# Patient Record
Sex: FEMALE | Race: BLACK OR AFRICAN AMERICAN | Employment: UNEMPLOYED | URBAN - METROPOLITAN AREA
[De-identification: names, ages, dates, MRNs, and addresses within clinical notes are randomized per-mention and may not be internally consistent; named-entity substitution may affect disease eponyms.]

---

## 2014-10-03 LAB — COLONOSCOPY, EXTERNAL: NORMAL

## 2017-01-09 RX ORDER — FUROSEMIDE 20 MG/1
TABLET ORAL
Qty: 30 TAB | Refills: 3 | Status: SHIPPED | OUTPATIENT
Start: 2017-01-09 | End: 2017-03-03 | Stop reason: SDUPTHER

## 2017-01-15 DIAGNOSIS — E55.9 VITAMIN D DEFICIENCY: ICD-10-CM

## 2017-01-16 RX ORDER — ERGOCALCIFEROL 1.25 MG/1
CAPSULE ORAL
Qty: 12 CAP | Refills: 0 | Status: SHIPPED | OUTPATIENT
Start: 2017-01-16 | End: 2017-04-12 | Stop reason: SDUPTHER

## 2017-03-03 DIAGNOSIS — G43.009 MIGRAINE WITHOUT AURA AND WITHOUT STATUS MIGRAINOSUS, NOT INTRACTABLE: ICD-10-CM

## 2017-03-03 DIAGNOSIS — R25.2 CRAMP OF BOTH LOWER EXTREMITIES: ICD-10-CM

## 2017-03-03 DIAGNOSIS — I10 BENIGN ESSENTIAL HTN: ICD-10-CM

## 2017-03-03 RX ORDER — ATORVASTATIN CALCIUM 20 MG/1
TABLET, FILM COATED ORAL
Qty: 90 TAB | Refills: 0 | Status: SHIPPED | OUTPATIENT
Start: 2017-03-03 | End: 2017-07-11 | Stop reason: SDUPTHER

## 2017-03-03 RX ORDER — POTASSIUM CHLORIDE 20 MEQ/1
TABLET, EXTENDED RELEASE ORAL
Qty: 180 TAB | Refills: 0 | Status: SHIPPED | OUTPATIENT
Start: 2017-03-03 | End: 2017-06-27 | Stop reason: SDUPTHER

## 2017-03-03 RX ORDER — FUROSEMIDE 20 MG/1
TABLET ORAL
Qty: 90 TAB | Refills: 0 | Status: SHIPPED | OUTPATIENT
Start: 2017-03-03 | End: 2017-06-18 | Stop reason: SDUPTHER

## 2017-03-03 RX ORDER — CYCLOBENZAPRINE HCL 10 MG
TABLET ORAL
Qty: 90 TAB | Refills: 0 | Status: SHIPPED | OUTPATIENT
Start: 2017-03-03 | End: 2017-05-31 | Stop reason: SDUPTHER

## 2017-03-03 RX ORDER — TRAZODONE HYDROCHLORIDE 50 MG/1
TABLET ORAL
Qty: 90 TAB | Refills: 0 | Status: SHIPPED | OUTPATIENT
Start: 2017-03-03 | End: 2018-03-19 | Stop reason: SDUPTHER

## 2017-03-03 NOTE — TELEPHONE ENCOUNTER
Patient is requesting that these medications be filled as a 90 day prescription, she states that when she was with Dr. Sandra Galeano they were filled like this and it is a lot cheaper for her as he insurance will cover more with a 90 day prescription.      Pharmacy on file verified

## 2017-05-31 DIAGNOSIS — R25.2 CRAMP OF BOTH LOWER EXTREMITIES: ICD-10-CM

## 2017-05-31 DIAGNOSIS — G43.009 MIGRAINE WITHOUT AURA AND WITHOUT STATUS MIGRAINOSUS, NOT INTRACTABLE: ICD-10-CM

## 2017-06-01 RX ORDER — CYCLOBENZAPRINE HCL 10 MG
TABLET ORAL
Qty: 90 TAB | Refills: 0 | Status: SHIPPED | OUTPATIENT
Start: 2017-06-01 | End: 2017-08-28 | Stop reason: SDUPTHER

## 2017-06-19 RX ORDER — FUROSEMIDE 20 MG/1
TABLET ORAL
Qty: 90 TAB | Refills: 0 | Status: SHIPPED | OUTPATIENT
Start: 2017-06-19 | End: 2017-09-15 | Stop reason: SDUPTHER

## 2017-06-27 RX ORDER — POTASSIUM CHLORIDE 1500 MG/1
TABLET, EXTENDED RELEASE ORAL
Qty: 180 TAB | Refills: 0 | Status: SHIPPED | OUTPATIENT
Start: 2017-06-27 | End: 2017-07-10 | Stop reason: SDUPTHER

## 2017-06-29 RX ORDER — ENALAPRIL MALEATE 5 MG/1
TABLET ORAL
Qty: 90 TAB | Refills: 0 | Status: SHIPPED | OUTPATIENT
Start: 2017-06-29 | End: 2017-09-25 | Stop reason: SDUPTHER

## 2017-07-10 RX ORDER — POTASSIUM CHLORIDE 1500 MG/1
TABLET, EXTENDED RELEASE ORAL
Qty: 180 TAB | Refills: 0 | Status: SHIPPED | OUTPATIENT
Start: 2017-07-10 | End: 2018-10-14 | Stop reason: SDUPTHER

## 2017-07-11 RX ORDER — ATORVASTATIN CALCIUM 20 MG/1
TABLET, FILM COATED ORAL
Qty: 90 TAB | Refills: 0 | Status: SHIPPED | OUTPATIENT
Start: 2017-07-11 | End: 2017-10-09 | Stop reason: SDUPTHER

## 2017-08-07 ENCOUNTER — HOSPITAL ENCOUNTER (OUTPATIENT)
Dept: LAB | Age: 62
Discharge: HOME | End: 2017-08-07
Payer: MEDICARE

## 2017-08-07 ENCOUNTER — OFFICE VISIT (OUTPATIENT)
Dept: FAMILY MEDICINE CLINIC | Age: 62
End: 2017-08-07

## 2017-08-07 VITALS
TEMPERATURE: 97.5 F | OXYGEN SATURATION: 95 % | HEIGHT: 63 IN | WEIGHT: 288 LBS | BODY MASS INDEX: 51.03 KG/M2 | DIASTOLIC BLOOD PRESSURE: 82 MMHG | RESPIRATION RATE: 18 BRPM | HEART RATE: 87 BPM | SYSTOLIC BLOOD PRESSURE: 141 MMHG

## 2017-08-07 DIAGNOSIS — R73.9 ELEVATED BLOOD SUGAR: ICD-10-CM

## 2017-08-07 DIAGNOSIS — E78.00 HYPERCHOLESTEROLEMIA: ICD-10-CM

## 2017-08-07 DIAGNOSIS — E55.9 VITAMIN D DEFICIENCY: ICD-10-CM

## 2017-08-07 DIAGNOSIS — R60.9 DEPENDENT EDEMA: ICD-10-CM

## 2017-08-07 DIAGNOSIS — I10 BENIGN ESSENTIAL HTN: Primary | ICD-10-CM

## 2017-08-07 DIAGNOSIS — R05.9 COUGH: ICD-10-CM

## 2017-08-07 PROCEDURE — 80061 LIPID PANEL: CPT

## 2017-08-07 PROCEDURE — 83036 HEMOGLOBIN GLYCOSYLATED A1C: CPT

## 2017-08-07 PROCEDURE — 80053 COMPREHEN METABOLIC PANEL: CPT

## 2017-08-07 PROCEDURE — 36415 COLL VENOUS BLD VENIPUNCTURE: CPT

## 2017-08-07 PROCEDURE — 82306 VITAMIN D 25 HYDROXY: CPT

## 2017-08-07 NOTE — PROGRESS NOTES
Chief Complaint   Patient presents with    Cholesterol Problem     follow up     Hypertension     follow up      1. Have you been to the ER, urgent care clinic since your last visit? Hospitalized since your last visit? No    2. Have you seen or consulted any other health care providers outside of the [facility] since your last visit? Include any pap smears or colon screening.  No

## 2017-08-07 NOTE — MR AVS SNAPSHOT
Visit Information Date & Time Provider Department Dept. Phone Encounter #  
 8/7/2017  5:30 PM Isabel Mason, [facility] [phone] [id number] Upcoming Health Maintenance Date Due DTaP/Tdap/Td series (1 - Tdap) 9/7/1975 PAP AKA CERVICAL CYTOLOGY 9/7/1975 BREAST CANCER SCRN MAMMOGRAM 9/7/2004 ZOSTER VACCINE AGE 60> 7/7/2014 INFLUENZA AGE 9 TO ADULT 8/1/2017 COLONOSCOPY 2/1/2022 Allergies as of 8/7/2017  Review Complete On: 8/7/2017 By: Isabel Mason NP Severity Noted Reaction Type Reactions Aspirin Medium 11/11/2013   Side Effect Nausea Only Current Immunizations  Reviewed on 11/19/2014 Name Date Influenza Vaccine (Quad) PF 10/20/2016 Influenza Vaccine PF 11/21/2014  9:28 AM  
  
 Not reviewed this visit You Were Diagnosed With   
  
 Codes Comments Benign essential HTN    -  Primary ICD-10-CM: I10 
ICD-9-CM: 401.1 Hypercholesterolemia     ICD-10-CM: E78.00 ICD-9-CM: 272.0 Elevated blood sugar     ICD-10-CM: R73.9 ICD-9-CM: 790.29 Vitamin D deficiency     ICD-10-CM: E55.9 ICD-9-CM: 268.9 Cough     ICD-10-CM: R05 ICD-9-CM: 495. 2 Vitals BP Pulse Temp Resp Height(growth percentile) Weight(growth percentile) 141/82 (BP 1 Location: Left arm, BP Patient Position: Sitting) 87 97.5 °F (36.4 °C) (Oral) 18 5' 3\" (1.6 m) 288 lb (130.6 kg) SpO2 BMI OB Status Smoking Status 95% 51.02 kg/m2 Hysterectomy Never Smoker Vitals History BMI and BSA Data Body Mass Index Body Surface Area 51.02 kg/m 2 2.41 m 2 Preferred Pharmacy Pharmacy Name Phone [id number] [place] [address] [facility] [phone] Your Updated Medication List  
  
   
This list is accurate as of: 8/7/17  6:01 PM.  Always use your most recent med list.  
  
  
  
  
 atorvastatin 20 mg tablet Commonly known as:  LIPITOR TAKE 1 TABLET BY MOUTH EVERY DAY  
  
 cyclobenzaprine 10 mg tablet Commonly known as:  FLEXERIL  
TAKE 1 TABLET BY MOUTH EVERY DAY  
  
 EMETROL Soln solution Generic drug:  phosphorated carbohydrate Take 15-30 mL by mouth every fifteen (15) minutes as needed for Nausea. enalapril 5 mg tablet Commonly known as:  VASOTEC  
TAKE 1 TABLET BY MOUTH EVERY DAY  
  
 ergocalciferol 50,000 unit capsule Commonly known as:  ERGOCALCIFEROL  
TAKE 1 CAP BY MOUTH EVERY SEVEN (7) DAYS. ON MONDAY  
  
 furosemide 20 mg tablet Commonly known as:  LASIX TAKE 1 TABLET BY MOUTH EVERY DAY  
  
 hydroCHLOROthiazide 25 mg tablet Commonly known as:  HYDRODIURIL  
TAKE 1 TAB BY MOUTH DAILY. KLOR-CON M20 20 mEq tablet Generic drug:  potassium chloride TAKE 1 TABLET BY MOUTH 2 TIMES A DAY. traZODone 50 mg tablet Commonly known as:  DESYREL  
TAKE 1 TABLET BY MOUTH NIGHTLY AS NEEDED FOR SLEEP We Performed the Following HEMOGLOBIN A1C WITH EAG [08549 CPT(R)] LIPID PANEL [21740 CPT(R)] METABOLIC PANEL, COMPREHENSIVE [66151 CPT(R)] AR COLLECTION VENOUS BLOOD,VENIPUNCTURE [id number] CPT(R)] AR HANDLG&/OR CONVEY OF SPEC FOR TR OFFICE TO LAB [42553 CPT(R)] VITAMIN D, 25 HYDROXY [id number] CPT(R)] Introducing [facility] & HEALTH SERVICES! Dear Marylu Angelo: Thank you for requesting a [employer] account. Our records indicate that you have previously registered for a [employer] account but its currently inactive. Please call our [employer] support line at [phone]. Additional Information If you have questions, please visit the Frequently Asked Questions section of the [employer] website at [email or web address] Remember, [employer] is NOT to be used for urgent needs. For medical emergencies, dial 911. Now available from your iPhone and Android! Please provide this summary of care documentation to your next provider. Your primary care clinician is listed as Yasmin Acosta. If you have any questions after today's visit, please call [phone].

## 2017-08-08 LAB
25(OH)D3+25(OH)D2 SERPL-MCNC: 37.5 NG/ML (ref 30–100)
ALBUMIN SERPL-MCNC: 4.5 G/DL (ref 3.6–4.8)
ALBUMIN/GLOB SERPL: 1.4 {RATIO} (ref 1.2–2.2)
ALP SERPL-CCNC: 85 IU/L (ref 39–117)
ALT SERPL-CCNC: 15 IU/L (ref 0–32)
AST SERPL-CCNC: 19 IU/L (ref 0–40)
BILIRUB SERPL-MCNC: 0.8 MG/DL (ref 0–1.2)
BUN SERPL-MCNC: 12 MG/DL (ref 8–27)
BUN/CREAT SERPL: 11 (ref 12–28)
CALCIUM SERPL-MCNC: 9.8 MG/DL (ref 8.7–10.3)
CHLORIDE SERPL-SCNC: 98 MMOL/L (ref 96–106)
CHOLEST SERPL-MCNC: 144 MG/DL (ref 100–199)
CO2 SERPL-SCNC: 24 MMOL/L (ref 18–29)
CREAT SERPL-MCNC: 1.05 MG/DL (ref 0.57–1)
GLOBULIN SER CALC-MCNC: 3.3 G/DL (ref 1.5–4.5)
GLUCOSE SERPL-MCNC: 85 MG/DL (ref 65–99)
HBA1C MFR BLD: NORMAL %
HDLC SERPL-MCNC: 46 MG/DL
INTERPRETATION, 910389: NORMAL
INTERPRETATION: NORMAL
LDLC SERPL CALC-MCNC: 64 MG/DL (ref 0–99)
PDF IMAGE, 910387: NORMAL
POTASSIUM SERPL-SCNC: 3.7 MMOL/L (ref 3.5–5.2)
PROT SERPL-MCNC: 7.8 G/DL (ref 6–8.5)
SODIUM SERPL-SCNC: 142 MMOL/L (ref 134–144)
TRIGL SERPL-MCNC: 168 MG/DL (ref 0–149)
VLDLC SERPL CALC-MCNC: 34 MG/DL (ref 5–40)

## 2017-08-08 NOTE — PROGRESS NOTES
HISTORY OF PRESENT ILLNESS  Francisco Hernandez is a 57 y.o. female. HPI  Follow up chronic medical problems. HTN. Taking prescribed meds. Hypercholesterolemia. Following healthy diet and exercising regularly. Taking atorvastatin as prescribed. Elevated FBS. Has decreased sweets and processed carbs in diet. Vitamin D deficiency. Recently completed 12 weeks of rx D. Currently not taking any supplement. Migraine headache. Takes flexeril with some sx relief. Dependent edema. Taking lasix daily. C/o dry cough at night over past few weeks. No history of asthma. Mild AR. No recent unusual exposures. Past Medical History:   Diagnosis Date    Arthritis     [facility]el obstruction ([facility]     Chronic pain     Diverticulitis     Hypercholesteremia     Hypertension     Migraines     Sickle cell trait ([facility]     Stroke ([facility] 1993    \"LEFT SIDE WEAKNESS\"    Unspecified adverse effect of anesthesia 2014    WOKE UP DURING COLONOSCOPY    Unspecified sleep apnea     NO CPAP     Patient Active Problem List   Diagnosis Code    Benign essential HTN I10    Hypercholesterolemia E78.00    Elevated blood sugar R73.9    Vitamin D deficiency E55.9    Migraine without aura and without status migrainosus, not intractable G43.009    Primary insomnia F51.01     Current Outpatient Prescriptions   Medication Sig    atorvastatin (LIPITOR) 20 mg tablet TAKE 1 TABLET BY MOUTH EVERY DAY    KLOR-CON M20 20 mEq tablet TAKE 1 TABLET BY MOUTH 2 TIMES A DAY.  enalapril (VASOTEC) 5 mg tablet TAKE 1 TABLET BY MOUTH EVERY DAY    furosemide (LASIX) 20 mg tablet TAKE 1 TABLET BY MOUTH EVERY DAY    hydroCHLOROthiazide (HYDRODIURIL) 25 mg tablet TAKE 1 TAB BY MOUTH DAILY.  ergocalciferol (ERGOCALCIFEROL) 50,000 unit capsule TAKE 1 CAP BY MOUTH EVERY SEVEN (7) DAYS.  ON MONDAY    traZODone (DESYREL) 50 mg tablet TAKE 1 TABLET BY MOUTH NIGHTLY AS NEEDED FOR SLEEP    phosphorated carbohydrate (EMETROL) soln solution Take 15-30 mL by mouth every fifteen (15) minutes as needed for Nausea.  cyclobenzaprine (FLEXERIL) 10 mg tablet TAKE 1 TABLET BY MOUTH EVERY DAY     No current facility-administered medications for this visit. Social History   Substance Use Topics    Smoking status: Never Smoker    Smokeless tobacco: Never Used    Alcohol use Yes      Comment: occasionally     Visit Vitals    /82 (BP 1 Location: Left arm, BP Patient Position: Sitting)    Pulse 87    Temp 97.5 °F (36.4 °C) (Oral)    Resp 18    Ht 5' 3\" (1.6 m)    Wt 288 lb (130.6 kg)    SpO2 95%    BMI 51.02 kg/m2     Review of Systems   Constitutional: Negative for chills and fever. Respiratory: Positive for cough. Negative for sputum production, shortness of breath and wheezing. Cardiovascular: Positive for leg swelling (intermittent). Negative for chest pain, palpitations and claudication. Musculoskeletal: Positive for back pain (intermittent). Neurological: Positive for headaches (occasional). All other systems reviewed and are negative. Physical Exam   Constitutional:   Overweight. Neck: Neck supple. No JVD present. Cardiovascular: Normal rate, regular rhythm and normal heart sounds. Pulmonary/Chest: Effort normal and breath sounds normal.   Abdominal: Soft. She exhibits no distension. There is no tenderness. There is no guarding. Musculoskeletal: Normal range of motion. She exhibits no edema. Lymphadenopathy:     She has no cervical adenopathy. Neurological: She is alert. Skin: Skin is warm and dry. Psychiatric: She has a normal mood and affect. Her behavior is normal.       ASSESSMENT and PLAN  Diagnoses and all orders for this visit:    1. Benign essential HTN  -     MS HANDLG&/OR CONVEY OF SPEC FOR TR OFFICE TO LAB  -     MS COLLECTION VENOUS BLOOD,VENIPUNCTURE  -     LIPID PANEL  -     METABOLIC PANEL, COMPREHENSIVE  Controlled. Will check labs this evening. Patient had 1/2 soft pretzel about 8 hours ago.     2. Hypercholesterolemia  Reviewed healthy,AHA diet and exercise recommendations. 3. Elevated blood sugar  -     HEMOGLOBIN A1C WITH EAG  Reviewed diabetic diet and carbohydrate restriction. 4. Vitamin D deficiency  -     VITAMIN D, 25 HYDROXY  Recheck D. Take OTC vitamin D3 1,000 iu daily. 5. Cough  May be secondary to AR. Trial benadryl 25 mg at bedtime. 6. Dependent edema    Stable on lasix. Follow up 6 months or sooner prn.

## 2017-08-28 DIAGNOSIS — G43.009 MIGRAINE WITHOUT AURA AND WITHOUT STATUS MIGRAINOSUS, NOT INTRACTABLE: ICD-10-CM

## 2017-08-28 DIAGNOSIS — R25.2 CRAMP OF BOTH LOWER EXTREMITIES: ICD-10-CM

## 2017-08-28 RX ORDER — CYCLOBENZAPRINE HCL 10 MG
TABLET ORAL
Qty: 90 TAB | Refills: 0 | Status: SHIPPED | OUTPATIENT
Start: 2017-08-28 | End: 2017-11-24 | Stop reason: SDUPTHER

## 2017-09-15 RX ORDER — FUROSEMIDE 20 MG/1
TABLET ORAL
Qty: 90 TAB | Refills: 1 | Status: SHIPPED | OUTPATIENT
Start: 2017-09-15 | End: 2018-03-16 | Stop reason: SDUPTHER

## 2017-09-25 RX ORDER — ENALAPRIL MALEATE 5 MG/1
TABLET ORAL
Qty: 90 TAB | Refills: 1 | Status: SHIPPED | OUTPATIENT
Start: 2017-09-25 | End: 2017-10-17 | Stop reason: SDUPTHER

## 2017-10-09 RX ORDER — ATORVASTATIN CALCIUM 20 MG/1
TABLET, FILM COATED ORAL
Qty: 90 TAB | Refills: 0 | Status: SHIPPED | OUTPATIENT
Start: 2017-10-09 | End: 2018-02-12 | Stop reason: SDUPTHER

## 2017-11-24 DIAGNOSIS — G43.009 MIGRAINE WITHOUT AURA AND WITHOUT STATUS MIGRAINOSUS, NOT INTRACTABLE: ICD-10-CM

## 2017-11-24 DIAGNOSIS — R25.2 CRAMP OF BOTH LOWER EXTREMITIES: ICD-10-CM

## 2017-11-26 RX ORDER — CYCLOBENZAPRINE HCL 10 MG
TABLET ORAL
Qty: 90 TAB | Refills: 0 | Status: SHIPPED | OUTPATIENT
Start: 2017-11-26 | End: 2018-03-16 | Stop reason: SDUPTHER

## 2018-02-19 DIAGNOSIS — I10 BENIGN ESSENTIAL HTN: ICD-10-CM

## 2018-02-19 RX ORDER — HYDROCHLOROTHIAZIDE 25 MG/1
TABLET ORAL
Qty: 90 TAB | Refills: 0 | Status: SHIPPED | OUTPATIENT
Start: 2018-02-19 | End: 2018-05-19 | Stop reason: SDUPTHER

## 2018-03-15 ENCOUNTER — PATIENT OUTREACH (OUTPATIENT)
Dept: FAMILY MEDICINE CLINIC | Age: 63
End: 2018-03-15

## 2018-03-15 NOTE — PROGRESS NOTES
Patient received on discharge report. [facility]. Reports pain onset  over the past few days. Endorses recent flight to [place] [employer]. Imaging completed: Xray L knee- noted for marked joint space narrowing of the left medial tibiofemoral compartment, findings consistent with osteoarthrosis. New Rx: Zofran ODT 4mg, Percocet 5-325mg, Prednisone 20mg. Outbound call made 3/15/18 to complete discharge assessment. NN left voicemail message with contact information requesting return call. Letter mailed.

## 2018-03-15 NOTE — LETTER
3/15/2018 2:57 PM 
 
Ms. Michael Ayala [address] [address] Dear MsClaudine Amilcar Suresh attempted to reach you today as a follow-up to your emergency room visit on 3/14/18 at [facility]. The ER discharge summary recommended that you follow up with your primary care practitioner. We have found that it is beneficial for our patients to have regular follow up with their primary care doctor's who will address any issues and adjust treatment accordingly. Our practice has extended hours to meet our patients needs and there is always an on call practitioner when the office is closed. Please call our office at the number above to schedule your ER follow-up with Tommy Braswell NP at your earliest convenience and don't hesitate to call me with any questions or concerns. Thank you for allowing us to participate in your care! Sincerely, Nisha Kincaid RN

## 2018-03-16 ENCOUNTER — TELEPHONE (OUTPATIENT)
Dept: FAMILY MEDICINE CLINIC | Age: 63
End: 2018-03-16

## 2018-03-16 DIAGNOSIS — R25.2 CRAMP OF BOTH LOWER EXTREMITIES: ICD-10-CM

## 2018-03-16 DIAGNOSIS — G43.009 MIGRAINE WITHOUT AURA AND WITHOUT STATUS MIGRAINOSUS, NOT INTRACTABLE: ICD-10-CM

## 2018-03-16 RX ORDER — CYCLOBENZAPRINE HCL 10 MG
TABLET ORAL
Qty: 90 TAB | Refills: 0 | Status: SHIPPED | OUTPATIENT
Start: 2018-03-16 | End: 2018-06-28 | Stop reason: SDUPTHER

## 2018-03-16 NOTE — TELEPHONE ENCOUNTER
Called and spoke with patient. Advised to make an appointment so that her medication can be refilled. Patient stated that she already has an appointment made, but she doesn't have one scheduled. Patient states that she will call back.

## 2018-03-16 NOTE — TELEPHONE ENCOUNTER
----- Message from [facility] sent at 3/16/2018  3:57 PM EDT -----  Regarding: NP Proctor/Telephone  Pt would like a call back from [facility] about an appt. Pt best contact [phone].

## 2018-03-16 NOTE — TELEPHONE ENCOUNTER
She is past due for a 6 mth fasting [facility]   After she makes appt, can OK refill of furosemide

## 2018-03-16 NOTE — TELEPHONE ENCOUNTER
Called and spoke with patient. Patient has an appointment scheduled for Monday, March 19, 2018 02:00 PM. Patient states that she will wait until her OV for refills.

## 2018-03-18 RX ORDER — FUROSEMIDE 20 MG/1
TABLET ORAL
Qty: 90 TAB | Refills: 0 | Status: SHIPPED | OUTPATIENT
Start: 2018-03-18 | End: 2018-06-13 | Stop reason: SDUPTHER

## 2018-03-19 ENCOUNTER — OFFICE VISIT (OUTPATIENT)
Dept: FAMILY MEDICINE CLINIC | Age: 63
End: 2018-03-19

## 2018-03-19 ENCOUNTER — HOSPITAL ENCOUNTER (OUTPATIENT)
Dept: LAB | Age: 63
Discharge: HOME | End: 2018-03-19
Payer: MEDICARE

## 2018-03-19 VITALS
HEART RATE: 102 BPM | WEIGHT: 277 LBS | OXYGEN SATURATION: 93 % | TEMPERATURE: 98.6 F | BODY MASS INDEX: 49.08 KG/M2 | HEIGHT: 63 IN | SYSTOLIC BLOOD PRESSURE: 121 MMHG | RESPIRATION RATE: 18 BRPM | DIASTOLIC BLOOD PRESSURE: 81 MMHG

## 2018-03-19 DIAGNOSIS — E66.01 MORBID OBESITY WITH BMI OF 45.0-49.9, ADULT (HCC): ICD-10-CM

## 2018-03-19 DIAGNOSIS — F51.01 PRIMARY INSOMNIA: ICD-10-CM

## 2018-03-19 DIAGNOSIS — E78.00 HYPERCHOLESTEROLEMIA: ICD-10-CM

## 2018-03-19 DIAGNOSIS — I10 BENIGN ESSENTIAL HTN: Primary | ICD-10-CM

## 2018-03-19 DIAGNOSIS — R73.03 PRE-DIABETES: ICD-10-CM

## 2018-03-19 PROCEDURE — 80053 COMPREHEN METABOLIC PANEL: CPT

## 2018-03-19 PROCEDURE — 36415 COLL VENOUS BLD VENIPUNCTURE: CPT

## 2018-03-19 PROCEDURE — 83036 HEMOGLOBIN GLYCOSYLATED A1C: CPT

## 2018-03-19 RX ORDER — TRAZODONE HYDROCHLORIDE 50 MG/1
TABLET ORAL
Qty: 90 TAB | Refills: 0 | Status: SHIPPED | OUTPATIENT
Start: 2018-03-19 | End: 2018-05-14 | Stop reason: SDUPTHER

## 2018-03-19 NOTE — PROGRESS NOTES
HISTORY OF PRESENT ILLNESS  Heath Vuong is a 60 y.o. female. HPI  6 month follow up chronic health problems. HTN. Adhering to medication regimen. Hypercholesterolemia. Taking lipitor as prescribed. Following healthy diet,  Walks 2 miles daily. Pre diabetes. Has reduced sugar and processed carbs in diet. Dependent edema. Taking lasix daily. Insomnia. Using trazodone on and off with good effect. Requesting rx refill. Past Medical History:   Diagnosis Date    Arthritis     Bowel obstruction     Chronic pain     Diverticulitis     Hypercholesteremia     Hypertension     Migraines     Sick[facility] cell trait ([facility]     Stroke ([facility] 1993    \"LEFT SIDE WEAKNESS\"    Unspecified adverse effect of anesthesia 2014    WOKE UP DURING COLONOSCOPY    Unspecified sleep apnea     NO CPAP    Vitamin D deficiency      Patient Active Problem List   Diagnosis Code    Benign essential HTN I10    Hypercholesterolemia E78.00    Primary insomnia F51.01    Morbid obesity with BMI of 45.0-49.9, adult ([facility]) E66.01, Z68.42     Current Outpatient Prescriptions   Medication Sig    traZODone (DESYREL) 50 mg tablet TAKE 1-2 TABLETS BY MOUTH NIGHTLY AS NEEDED FOR SLEEP    furosemide (LASIX) 20 mg tablet TAKE 1 TABLET BY MOUTH EVERY DAY    cyclobenzaprine (FLEXERIL) 10 mg tablet TAKE 1 TABLET BY MOUTH EVERY DAY    hydroCHLOROthiazide (HYDRODIURIL) 25 mg tablet TAKE 1 TAB BY MOUTH DAILY. APPOINTMENT DUE.  atorvastatin (LIPITOR) 20 mg tablet Take 1 Tab by mouth daily. Appointment due.  enalapril (VASOTEC) 5 mg tablet TAKE 1 TABLET BY MOUTH EVERY DAY    KLOR-CON M20 20 mEq tablet TAKE 1 TABLET BY MOUTH 2 TIMES A DAY.  phosphorated carbohydrate (EMETROL) soln solution Take 15-30 mL by mouth every fifteen (15) minutes as needed for Nausea. No current facility-administered medications for this visit.       Social History   Substance Use Topics    Smoking status: Never Smoker    Smokeless tobacco: Never Used    Alcohol use Yes      Comment: occasionally     Visit Vitals    /81 (BP 1 Location: Left arm, BP Patient Position: Sitting)    Pulse (!) 102    Temp 98.6 °F (37 °C) (Oral)    Resp 18    Ht 5' 3\" (1.6 m)    Wt 277 lb (125.6 kg)    SpO2 93%    BMI 49.07 kg/m2     Review of Systems   Constitutional: Negative. Respiratory: Negative for cough and shortness of breath. Cardiovascular: Positive for leg swelling (stable). Negative for chest pain and palpitations. Psychiatric/Behavioral: The patient has insomnia. All other systems reviewed and are negative. Physical Exam   Constitutional: No distress. Obese. Neck: Neck supple. Cardiovascular: Normal rate, regular rhythm and normal heart sounds. Pulmonary/Chest: Effort normal and breath sounds normal.   Abdominal: Soft. She exhibits no distension. There is no tenderness. There is no guarding. Musculoskeletal: She exhibits no edema. Lymphadenopathy:     She has no cervical adenopathy. Neurological: She is alert. Skin: Skin is warm and dry. Psychiatric: She has a normal mood and affect. ASSESSMENT and PLAN  Diagnoses and all orders for this visit:    1. Benign essential HTN  -     METABOLIC PANEL, COMPREHENSIVE  -     [place] HANDLG&/OR CONVEY OF SPEC FOR TR OFFICE TO LAB  -     COLLECTION VENOUS BLOOD,VENIPUNCTURE  Well controlled  Continue current treatment. Check non fasting labs today. 2. Hypercholesterolemia  LDL at goal with last labs. Triglycerides mildly elevated. Reviewed healthy,AHA diet and exercise recommendations. Continue lipitor. 3. Pre-diabetes  -     HEMOGLOBIN A1C WITH EAG  Reviewed diabetic diet and carbohydrate restriction. 4. Primary insomnia  -     traZODone (DESYREL) 50 mg tablet; TAKE 1-2 TABLETS BY MOUTH NIGHTLY AS NEEDED FOR SLEEP  Stable on prn trazodone. Medication risks, benefits and potential side effects were reviewed. Sleep hygiene reviewed.     5. Morbid obesity with BMI of 45.0-49.9, adult ([facility]  Weight loss recommendations given. Continue daily walking regimen. Follow up 6 months or sooner prn.

## 2018-03-19 NOTE — PROGRESS NOTES
Chief Complaint   Patient presents with   [facility] Follow Up     [facility] 3/14/18 Degenerative Joint Disease. L leg    Leg Pain       1. Have you been to the ER, urgent care clinic since your last visit? Hospitalized since your last visit? Yes Where: [facility]    2. Have you seen or consulted any other health care providers outside of the [address] [place] [address] since your last visit? Include any pap smears or colon screening.  No

## 2018-03-19 NOTE — MR AVS SNAPSHOT
Lavaun Jeans 
 
 
 [address] [facility] [address] [place] [address] 
[phone] Patient: Yesenia Adam MRN: [id number] VGL:[id number] Visit Information Date & Time Provider Department Dept. Phone Encounter #  
 3/19/2018  2:00 PM Diogo Abad, [address] [facility] [phone] [id number] Upcoming Health Maintenance Date Due  
 BREAST CANCER SCRN MAMMOGRAM 9/7/2004 ZOSTER VACCINE AGE 60> 7/7/2014 PAP AKA CERVICAL CYTOLOGY 3/19/2021 COLONOSCOPY 2/1/2022 DTaP/Tdap/Td series (2 - Td) 8/7/2027 Allergies as of 3/19/2018  Review Complete On: 3/19/2018 By: Diogo Abad NP Severity Noted Reaction Type Reactions Aspirin Medium 11/11/2013   Side Effect Nausea Only Current Immunizations  Reviewed on 11/19/2014 Name Date Influenza Vaccine (Quad) PF 10/20/2016 Influenza Vaccine PF 11/21/2014  9:28 AM  
  
 Not reviewed this visit You Were Diagnosed With   
  
 Codes Comments Benign essential HTN    -  Primary ICD-10-CM: I10 
ICD-9-CM: 401.1 Hypercholesterolemia     ICD-10-CM: E78.00 ICD-9-CM: 272.0 Pre-diabetes     ICD-10-CM: R73.03 
ICD-9-CM: 790.29 Primary insomnia     ICD-10-CM: F51.01 
ICD-9-CM: 307.42 Vitals BP Pulse Temp Resp Height(growth percentile) Weight(growth percentile) 121/81 (BP 1 Location: Left arm, BP Patient Position: Sitting) (!) 102 98.6 °F (37 °C) (Oral) 18 5' 3\" (1.6 m) 277 lb (125.6 kg) SpO2 BMI OB Status Smoking Status 93% 49.07 kg/m2 Hysterectomy Never Smoker BMI and BSA Data Body Mass Index Body Surface Area 49.07 kg/m 2 2.36 m 2 Preferred Pharmacy Pharmacy Name Phone [id number] [place], [address] [phone] Your Updated Medication List  
  
   
This list is accurate as of 3/19/18  2:32 PM.  Always use your most recent med list.  
  
  
  
  
 atorvastatin 20 mg tablet Commonly known as:  LIPITOR Take 1 Tab by mouth daily. Appointment due. cyclobenzaprine 10 mg tablet Commonly known as:  FLEXERIL  
TAKE 1 TABLET BY MOUTH EVERY DAY  
  
 EMETROL Soln solution Generic drug:  phosphorated carbohydrate Take 15-30 mL by mouth every fifteen (15) minutes as needed for Nausea. enalapril 5 mg tablet Commonly known as:  VASOTEC  
TAKE 1 TABLET BY MOUTH EVERY DAY  
  
 furosemide 20 mg tablet Commonly known as:  LASIX TAKE 1 TABLET BY MOUTH EVERY DAY  
  
 hydroCHLOROthiazide 25 mg tablet Commonly known as:  HYDRODIURIL  
TAKE 1 TAB BY MOUTH DAILY. APPOINTMENT DUE. KLOR-CON M20 20 mEq tablet Generic drug:  potassium chloride TAKE 1 TABLET BY MOUTH 2 TIMES A DAY. traZODone 50 mg tablet Commonly known as:  DESYREL  
TAKE 1-2 TABLETS BY MOUTH NIGHTLY AS NEEDED FOR SLEEP Prescriptions Sent to Pharmacy Refills  
 traZODone (DESYREL) 50 mg tablet 0 Sig: TAKE 1-2 TABLETS BY MOUTH NIGHTLY AS NEEDED FOR SLEEP Class: Normal  
 Pharmacy: [facility]/pharmacy [address] [place] [address] #: [phone] We Performed the Following COLLECTION VENOUS BLOOD,VENIPUNCTURE [id number] CPT(R)] HEMOGLOBIN A1C WITH EAG [54603 CPT(R)] METABOLIC PANEL, COMPREHENSIVE [22970 CPT(R)] [place] HANDLG&/OR CONVEY OF SPEC FOR TR OFFICE TO LAB [20859 CPT(R)] Introducing [facility] & HEALTH SERVICES! [employer] introduces [employer] patient portal. Now you can access parts of your medical record, email your doctor's office, and request medication refills online. 1. In your internet browser, go to [email or web address] 2. Click on the First Time User? Click Here link in the Sign In box. You will see the New Member Sign Up page. 3. Enter your [employer] Access Code exactly as it appears below.  You will not need to use this code after youve completed the sign-up process. If you do not sign up before the expiration date, you must request a new code. · [facility] Access Code: [email or web address] Expires: 6/17/2018  1:48 PM 
 
4. Enter the last four digits of your Social Security Number (xxxx) and Date of Birth (mm/dd/yyyy) as indicated and click Submit. You will be taken to the next sign-up page. 5. Create a [facility] ID. This will be your [facility] login ID and cannot be changed, so think of one that is secure and easy to remember. 6. Create a [facility] password. You can change your password at any time. 7. Enter your Password Reset Question and Answer. This can be used at a later time if you forget your password. 8. Enter your e-mail address. You will receive e-mail notification when new information is available in [address]. 9. Click Sign Up. You can now view and download portions of your medical record. 10. Click the Download Summary menu link to download a portable copy of your medical information. If you have questions, please visit the Frequently Asked Questions section of the [facility] website. Remember, [facility] is NOT to be used for urgent needs. For medical emergencies, dial 911. Now available from your iPhone and Android! Please provide this summary of care documentation to your next provider. Your primary care clinician is listed as Patricia Hendrickson. If you have any questions after today's visit, please call [phone].

## 2018-03-20 LAB
ALBUMIN SERPL-MCNC: 4.6 G/DL (ref 3.6–4.8)
ALBUMIN/GLOB SERPL: 1.6 {RATIO} (ref 1.2–2.2)
ALP SERPL-CCNC: 67 IU/L (ref 39–117)
ALT SERPL-CCNC: 20 IU/L (ref 0–32)
AST SERPL-CCNC: 18 IU/L (ref 0–40)
BILIRUB SERPL-MCNC: 0.4 MG/DL (ref 0–1.2)
BUN SERPL-MCNC: 24 MG/DL (ref 8–27)
BUN/CREAT SERPL: 18 (ref 12–28)
CALCIUM SERPL-MCNC: 10.5 MG/DL (ref 8.7–10.3)
CHLORIDE SERPL-SCNC: 101 MMOL/L (ref 96–106)
CO2 SERPL-SCNC: 25 MMOL/L (ref 18–29)
CREAT SERPL-MCNC: 1.32 MG/DL (ref 0.57–1)
EST. AVERAGE GLUCOSE BLD GHB EST-MCNC: 126 MG/DL
GFR SERPLBLD CREATININE-BSD FMLA CKD-EPI: 43 ML/MIN/1.73
GFR SERPLBLD CREATININE-BSD FMLA CKD-EPI: 50 ML/MIN/1.73
GLOBULIN SER CALC-MCNC: 2.9 G/DL (ref 1.5–4.5)
GLUCOSE SERPL-MCNC: 139 MG/DL (ref 65–99)
HBA1C MFR BLD: 6 % (ref 4.8–5.6)
INTERPRETATION: NORMAL
POTASSIUM SERPL-SCNC: 4.8 MMOL/L (ref 3.5–5.2)
PROT SERPL-MCNC: 7.5 G/DL (ref 6–8.5)
SODIUM SERPL-SCNC: 145 MMOL/L (ref 134–144)

## 2018-04-20 ENCOUNTER — TELEPHONE (OUTPATIENT)
Dept: FAMILY MEDICINE CLINIC | Age: 63
End: 2018-04-20

## 2018-04-24 ENCOUNTER — TELEPHONE (OUTPATIENT)
Dept: FAMILY MEDICINE CLINIC | Age: 63
End: 2018-04-24

## 2018-04-24 NOTE — TELEPHONE ENCOUNTER
YUNG Alcantara LPN        Caller: Unspecified (Today, 12:27 PM)                     Okay to refer to ortho.        She will schedule appt

## 2018-04-24 NOTE — TELEPHONE ENCOUNTER
Patient requesting a call back from nurse in ref to additional info needed for a referral Best # [phone]

## 2018-04-24 NOTE — TELEPHONE ENCOUNTER
Seen 03/19/18 was in ER on 03/14/18  Has DJD, given steroids which helped at the time. Left leg pain.  wants to see ortho as its still very painful

## 2018-05-14 DIAGNOSIS — F51.01 PRIMARY INSOMNIA: ICD-10-CM

## 2018-05-14 RX ORDER — TRAZODONE HYDROCHLORIDE 50 MG/1
TABLET ORAL
Qty: 90 TAB | Refills: 0 | Status: SHIPPED | OUTPATIENT
Start: 2018-05-14 | End: 2018-07-05 | Stop reason: SDUPTHER

## 2018-06-13 RX ORDER — FUROSEMIDE 20 MG/1
TABLET ORAL
Qty: 90 TAB | Refills: 0 | Status: SHIPPED | OUTPATIENT
Start: 2018-06-13 | End: 2018-09-11 | Stop reason: SDUPTHER

## 2018-06-28 DIAGNOSIS — G43.009 MIGRAINE WITHOUT AURA AND WITHOUT STATUS MIGRAINOSUS, NOT INTRACTABLE: ICD-10-CM

## 2018-06-28 DIAGNOSIS — R25.2 CRAMP OF BOTH LOWER EXTREMITIES: ICD-10-CM

## 2018-06-28 RX ORDER — CYCLOBENZAPRINE HCL 10 MG
TABLET ORAL
Qty: 90 TAB | Refills: 0 | Status: SHIPPED | OUTPATIENT
Start: 2018-06-28 | End: 2018-09-30 | Stop reason: SDUPTHER

## 2018-07-05 DIAGNOSIS — F51.01 PRIMARY INSOMNIA: ICD-10-CM

## 2018-07-05 RX ORDER — TRAZODONE HYDROCHLORIDE 50 MG/1
TABLET ORAL
Qty: 90 TAB | Refills: 0 | Status: SHIPPED | OUTPATIENT
Start: 2018-07-05 | End: 2018-08-17 | Stop reason: SDUPTHER

## 2018-07-11 RX ORDER — ENALAPRIL MALEATE 5 MG/1
TABLET ORAL
Qty: 90 TAB | Refills: 0 | Status: SHIPPED | OUTPATIENT
Start: 2018-07-11 | End: 2018-10-09 | Stop reason: SDUPTHER

## 2018-08-17 DIAGNOSIS — F51.01 PRIMARY INSOMNIA: ICD-10-CM

## 2018-08-17 RX ORDER — TRAZODONE HYDROCHLORIDE 50 MG/1
TABLET ORAL
Qty: 90 TAB | Refills: 0 | Status: SHIPPED | OUTPATIENT
Start: 2018-08-17 | End: 2019-07-15

## 2018-11-01 ENCOUNTER — OFFICE VISIT (OUTPATIENT)
Dept: FAMILY MEDICINE CLINIC | Age: 63
End: 2018-11-01

## 2018-11-01 VITALS
DIASTOLIC BLOOD PRESSURE: 86 MMHG | BODY MASS INDEX: 47.13 KG/M2 | OXYGEN SATURATION: 98 % | HEIGHT: 63 IN | TEMPERATURE: 98.3 F | HEART RATE: 88 BPM | SYSTOLIC BLOOD PRESSURE: 126 MMHG | WEIGHT: 266 LBS | RESPIRATION RATE: 16 BRPM

## 2018-11-01 DIAGNOSIS — F51.01 PRIMARY INSOMNIA: ICD-10-CM

## 2018-11-01 DIAGNOSIS — E78.00 HYPERCHOLESTEROLEMIA: ICD-10-CM

## 2018-11-01 DIAGNOSIS — H61.22 IMPACTED CERUMEN OF LEFT EAR: ICD-10-CM

## 2018-11-01 DIAGNOSIS — I10 BENIGN ESSENTIAL HTN: Primary | ICD-10-CM

## 2018-11-01 DIAGNOSIS — Z23 ENCOUNTER FOR IMMUNIZATION: ICD-10-CM

## 2018-11-01 DIAGNOSIS — Z12.31 ENCOUNTER FOR SCREENING MAMMOGRAM FOR BREAST CANCER: ICD-10-CM

## 2018-11-01 DIAGNOSIS — R60.9 DEPENDENT EDEMA: ICD-10-CM

## 2018-11-01 DIAGNOSIS — R79.89 ELEVATED SERUM CREATININE: ICD-10-CM

## 2018-11-01 DIAGNOSIS — R73.03 PRE-DIABETES: ICD-10-CM

## 2018-11-01 NOTE — PROGRESS NOTES
HISTORY OF PRESENT ILLNESS Dorie Aceves is a 58 y.o. female. HPI Follow up chronic health problems. HTN. Adhering to medication regimen. Hypercholesterolemia. Taking prescribed statin. Following healthy diet, doing some walking. Exercise is limited by OA of left knee. Pre diabetes. Has reduced sugar and processed carbs in diet. Has cut back on sugary drinks. Dependent edema. Taking prescribed lasix with good sx control. Serum creatinine was mildly elevated at last OV at 1.32. No history of kidney disease. Insomnia. Taking trazodone with good effect. Due for screening mammogram.  No longer seeing gyn. C/o left ear feeling blocked over past few weeks. No pain or change in hearing. Obesity. Weight is down about 11 lb with dietary modifications. Past Medical History:  
Diagnosis Date  Arthritis  Bowel obstruction ([facility]  Chronic pain  Diverticulitis  Hypercholesteremia  Hypertension  Migraines  Sickle cell trait ([facility]  Stroke [facility]) 7870 \"LEFT SIDE WEAKNESS\"  Unspecified adverse effect of anesthesia 2014 WOKE UP DURING COLONOSCOPY  Unspecified sleep apnea NO CPAP  Vitamin D deficiency Patient Active Problem List  
Diagnosis Code  Benign essential HTN I10  
 Hypercholesterolemia E78.00  
 Primary insomnia F51.01  
 Morbid obesity with BMI of 45.0-49.9, adult ([facility]) E66.01, Z68.42  
 Pre-diabetes R73.03  
 Dependent edema R60.9 Current Outpatient Medications Medication Sig  potassium chloride (KLOR-CON M20) 20 mEq tablet Take 1 Tab by mouth daily. Appointment due.  enalapril (VASOTEC) 5 mg tablet Take 1 Tab by mouth daily. Appointment due.  cyclobenzaprine (FLEXERIL) 10 mg tablet Take 1 Tab by mouth nightly as needed for Muscle Spasm(s). Appointment due.  furosemide (LASIX) 20 mg tablet Take 1 Tab by mouth daily. Appointment due.   
 hydroCHLOROthiazide (HYDRODIURIL) 25 mg tablet Take 1 Tab by mouth daily. Appointment due.  traZODone (DESYREL) 50 mg tablet TAKE 1 TO 2 TABLETS BY MOUTH EVERY EVENING AS NEEDED FOR SLEEP  
 atorvastatin (LIPITOR) 20 mg tablet TAKE 1 TABLET BY MOUTH EVERY DAY  phosphorated carbohydrate (EMETROL) soln solution Take 15-30 mL by mouth every fifteen (15) minutes as needed for Nausea. No current facility-administered medications for this visit. Social History Tobacco Use  Smoking status: Never Smoker  Smokeless tobacco: Never Used Substance Use Topics  Alcohol use: Yes Comment: occasionally  Drug use: No  
 
Visit Vitals /86 Pulse 88 Temp 98.3 °F (36.8 °C) (Oral) Resp 16 Ht 5' 3\" (1.6 m) Wt 266 lb (120.7 kg) SpO2 98% BMI 47.12 kg/m² Review of Systems Constitutional: Negative. Respiratory: Negative for cough and shortness of breath. Cardiovascular: Negative for chest pain, palpitations, orthopnea and leg swelling. Musculoskeletal: Positive for joint pain. Psychiatric/Behavioral: The patient has insomnia. All other systems reviewed and are negative. Physical Exam  
Constitutional: No distress. Obese. Neck: Neck supple. Cardiovascular: Normal rate, regular rhythm and normal heart sounds. Pulmonary/Chest: Effort normal and breath sounds normal.  
Abdominal: Soft. She exhibits no distension. There is no tenderness. There is no guarding. Musculoskeletal: Normal range of motion. She exhibits no edema. Lymphadenopathy:  
  She has no cervical adenopathy. Neurological: She is alert. Coordination normal.  
Skin: Skin is warm and dry. Psychiatric: She has a normal mood and affect. ASSESSMENT and PLAN Diagnoses and all orders for this visit: 1. Benign essential HTN Controlled. Continue current treatment. 2. Encounter for immunization 
-     INFLUENZA VIRUS VAC QUAD,SPLIT,PRESV FREE SYRINGE IM 3. Hypercholesterolemia -     LIPID PANEL 
-     METABOLIC PANEL, COMPREHENSIVE 
 -     MD HANDLG&/OR CONVEY OF SPEC FOR TR OFFICE TO LAB 
-     COLLECTION VENOUS BLOOD,VENIPUNCTURE She will return for fasting labs. Reviewed healthy diet and exercise recommendations. 4. Pre-diabetes 
-     HEMOGLOBIN A1C WITH EAG Reviewed diabetic diet and carbohydrate restriction. 5. Dependent edema Stable on lasix. 6. Primary insomnia Stable on trazodone. 7. Encounter for screening mammogram for breast cancer -     LETICIA MAMMO BI SCREENING INCL CAD; Future 8. Impacted cerumen of left ear Declined lavage. Recommend OTC debrox. 9. BMI 45.0-49.9, adult ([address] Commended on weight loss. Reviewed above normal BMI with patient. The following interventions were recommended: reduced calorie diet, nutritional guidance and counseling given, exercise encouragement and weight monitoring. 10. Elevated serum creatinine -     METABOLIC PANEL, COMPREHENSIVE Repeat kidney function. Follow up 6 months or sooner prn.

## 2018-11-01 NOTE — PROGRESS NOTES
Chief Complaint Patient presents with  Hypertension Follow up  Cholesterol Problem Follow up  Blood sugar problem Pre-diabetes follow up.  Insomnia Follow up. 1. Have you been to the ER, urgent care clinic since your last visit? Hospitalized since your last visit? No 
 
2. Have you seen or consulted any other health care providers outside of the [address] [place] [address] since your last visit? Include any pap smears or colon screening.  No

## 2018-11-12 RX ORDER — ATORVASTATIN CALCIUM 20 MG/1
TABLET, FILM COATED ORAL
Qty: 90 TAB | Refills: 1 | Status: SHIPPED | OUTPATIENT
Start: 2018-11-12 | End: 2019-05-16 | Stop reason: SDUPTHER

## 2018-11-21 DIAGNOSIS — I10 BENIGN ESSENTIAL HTN: ICD-10-CM

## 2018-11-21 RX ORDER — HYDROCHLOROTHIAZIDE 25 MG/1
25 TABLET ORAL DAILY
Qty: 90 TAB | Refills: 0 | Status: SHIPPED | OUTPATIENT
Start: 2018-11-21 | End: 2019-02-20 | Stop reason: SDUPTHER

## 2019-01-04 DIAGNOSIS — R25.2 CRAMP OF BOTH LOWER EXTREMITIES: ICD-10-CM

## 2019-01-04 DIAGNOSIS — G43.009 MIGRAINE WITHOUT AURA AND WITHOUT STATUS MIGRAINOSUS, NOT INTRACTABLE: ICD-10-CM

## 2019-01-07 RX ORDER — CYCLOBENZAPRINE HCL 10 MG
TABLET ORAL
Qty: 90 TAB | Refills: 0 | Status: SHIPPED | OUTPATIENT
Start: 2019-01-07 | End: 2019-04-06 | Stop reason: SDUPTHER

## 2019-01-09 RX ORDER — ENALAPRIL MALEATE 5 MG/1
5 TABLET ORAL DAILY
Qty: 90 TAB | Refills: 0 | Status: SHIPPED | OUTPATIENT
Start: 2019-01-09 | End: 2019-04-07 | Stop reason: SDUPTHER

## 2019-03-18 DIAGNOSIS — I10 BENIGN ESSENTIAL HTN: ICD-10-CM

## 2019-03-18 RX ORDER — HYDROCHLOROTHIAZIDE 25 MG/1
25 TABLET ORAL DAILY
Qty: 30 TAB | Refills: 0 | Status: SHIPPED | OUTPATIENT
Start: 2019-03-18 | End: 2019-04-16 | Stop reason: SDUPTHER

## 2019-03-18 NOTE — TELEPHONE ENCOUNTER
Patient is calling wanting to see if [facility] can refill her medications, pt does not have the names of the medication. Pt stated that she is not able to come into the office for a visit, pt is in [address] taking care of her mom at the time.       Pharmacy verified  ([facility] in [address])      [address]:  Thursday, November 01, 2018

## 2019-04-06 DIAGNOSIS — R25.2 CRAMP OF BOTH LOWER EXTREMITIES: ICD-10-CM

## 2019-04-06 DIAGNOSIS — G43.009 MIGRAINE WITHOUT AURA AND WITHOUT STATUS MIGRAINOSUS, NOT INTRACTABLE: ICD-10-CM

## 2019-04-08 RX ORDER — ENALAPRIL MALEATE 5 MG/1
5 TABLET ORAL DAILY
Qty: 90 TAB | Refills: 0 | Status: SHIPPED | OUTPATIENT
Start: 2019-04-08 | End: 2019-07-08 | Stop reason: SDUPTHER

## 2019-04-08 RX ORDER — CYCLOBENZAPRINE HCL 10 MG
TABLET ORAL
Qty: 90 TAB | Refills: 0 | Status: SHIPPED | OUTPATIENT
Start: 2019-04-08 | End: 2019-07-07 | Stop reason: SDUPTHER

## 2019-04-08 RX ORDER — POTASSIUM CHLORIDE 1500 MG/1
TABLET, EXTENDED RELEASE ORAL
Qty: 180 TAB | Refills: 0 | Status: SHIPPED | OUTPATIENT
Start: 2019-04-08 | End: 2019-07-08 | Stop reason: SDUPTHER

## 2019-04-16 DIAGNOSIS — I10 BENIGN ESSENTIAL HTN: ICD-10-CM

## 2019-04-16 RX ORDER — HYDROCHLOROTHIAZIDE 25 MG/1
25 TABLET ORAL DAILY
Qty: 30 TAB | Refills: 0 | Status: SHIPPED | OUTPATIENT
Start: 2019-04-16 | End: 2019-05-12 | Stop reason: SDUPTHER

## 2019-05-02 ENCOUNTER — TELEPHONE (OUTPATIENT)
Dept: FAMILY MEDICINE CLINIC | Age: 64
End: 2019-05-02

## 2019-05-02 NOTE — TELEPHONE ENCOUNTER
Patient is calling, stating that she is in [place] right now with her 80year old mother. She doesn't know when she will return.  She is requesting YUNG Gimenez to call her and to fax over her lab order to the   Lab Katheryn at :  [facility]  (Will give more details once someone return her call)  She needs her medication refills so need to do her labs there  LOV:  November 01, 2018  Last labs : 3/19/18  BCB# [phone]

## 2019-05-02 NOTE — TELEPHONE ENCOUNTER
I tried to find the certain [facility] the patient recommended, but could not find it nor could I find a telephone/fax number to go along with that location. If patient calls back, inform her that we can mail her the orders to her address that she at this moment and that she can take it to the [facility] that is closest to her.

## 2019-05-12 DIAGNOSIS — I10 BENIGN ESSENTIAL HTN: ICD-10-CM

## 2019-05-13 RX ORDER — HYDROCHLOROTHIAZIDE 25 MG/1
25 TABLET ORAL DAILY
Qty: 30 TAB | Refills: 0 | Status: SHIPPED | OUTPATIENT
Start: 2019-05-13 | End: 2019-06-07 | Stop reason: SDUPTHER

## 2019-05-15 ENCOUNTER — HOSPITAL ENCOUNTER (OUTPATIENT)
Dept: LAB | Age: 64
Discharge: HOME | End: 2019-05-15
Payer: MEDICARE

## 2019-05-15 PROCEDURE — 80061 LIPID PANEL: CPT

## 2019-05-15 PROCEDURE — 83036 HEMOGLOBIN GLYCOSYLATED A1C: CPT

## 2019-05-15 PROCEDURE — 80053 COMPREHEN METABOLIC PANEL: CPT

## 2019-05-15 PROCEDURE — 36415 COLL VENOUS BLD VENIPUNCTURE: CPT

## 2019-05-16 LAB
ALBUMIN SERPL-MCNC: 4.3 G/DL (ref 3.6–4.8)
ALBUMIN/GLOB SERPL: 1.5 {RATIO} (ref 1.2–2.2)
ALP SERPL-CCNC: 93 IU/L (ref 39–117)
ALT SERPL-CCNC: 16 IU/L (ref 0–32)
AST SERPL-CCNC: 16 IU/L (ref 0–40)
BILIRUB SERPL-MCNC: 0.6 MG/DL (ref 0–1.2)
BUN SERPL-MCNC: 12 MG/DL (ref 8–27)
BUN/CREAT SERPL: 12 (ref 12–28)
CALCIUM SERPL-MCNC: 9.8 MG/DL (ref 8.7–10.3)
CHLORIDE SERPL-SCNC: 99 MMOL/L (ref 96–106)
CHOLEST SERPL-MCNC: 137 MG/DL (ref 100–199)
CO2 SERPL-SCNC: 26 MMOL/L (ref 20–29)
CREAT SERPL-MCNC: 0.98 MG/DL (ref 0.57–1)
EST. AVERAGE GLUCOSE BLD GHB EST-MCNC: 123 MG/DL
GLOBULIN SER CALC-MCNC: 2.9 G/DL (ref 1.5–4.5)
GLUCOSE SERPL-MCNC: 112 MG/DL (ref 65–99)
HBA1C MFR BLD: 5.9 % (ref 4.8–5.6)
HDLC SERPL-MCNC: 46 MG/DL
INTERPRETATION, 910389: NORMAL
LDLC SERPL CALC-MCNC: 72 MG/DL (ref 0–99)
POTASSIUM SERPL-SCNC: 3.6 MMOL/L (ref 3.5–5.2)
PROT SERPL-MCNC: 7.2 G/DL (ref 6–8.5)
SODIUM SERPL-SCNC: 142 MMOL/L (ref 134–144)
TRIGL SERPL-MCNC: 94 MG/DL (ref 0–149)
VLDLC SERPL CALC-MCNC: 19 MG/DL (ref 5–40)

## 2019-06-10 RX ORDER — FUROSEMIDE 20 MG/1
TABLET ORAL
Qty: 90 TAB | Refills: 0 | Status: SHIPPED | OUTPATIENT
Start: 2019-06-10 | End: 2019-09-09 | Stop reason: SDUPTHER

## 2019-07-07 DIAGNOSIS — G43.009 MIGRAINE WITHOUT AURA AND WITHOUT STATUS MIGRAINOSUS, NOT INTRACTABLE: ICD-10-CM

## 2019-07-07 DIAGNOSIS — R25.2 CRAMP OF BOTH LOWER EXTREMITIES: ICD-10-CM

## 2019-07-08 RX ORDER — CYCLOBENZAPRINE HCL 10 MG
TABLET ORAL
Qty: 90 TAB | Refills: 0 | Status: SHIPPED | OUTPATIENT
Start: 2019-07-08 | End: 2019-10-04 | Stop reason: SDUPTHER

## 2019-07-08 RX ORDER — ENALAPRIL MALEATE 5 MG/1
5 TABLET ORAL DAILY
Qty: 90 TAB | Refills: 0 | Status: SHIPPED | OUTPATIENT
Start: 2019-07-08 | End: 2019-10-07 | Stop reason: SDUPTHER

## 2019-07-08 RX ORDER — POTASSIUM CHLORIDE 1500 MG/1
TABLET, EXTENDED RELEASE ORAL
Qty: 180 TAB | Refills: 0 | Status: SHIPPED | OUTPATIENT
Start: 2019-07-08 | End: 2022-03-21 | Stop reason: SDUPTHER

## 2019-07-15 ENCOUNTER — OFFICE VISIT (OUTPATIENT)
Dept: FAMILY MEDICINE CLINIC | Age: 64
End: 2019-07-15

## 2019-07-15 VITALS
DIASTOLIC BLOOD PRESSURE: 82 MMHG | HEIGHT: 63 IN | SYSTOLIC BLOOD PRESSURE: 120 MMHG | OXYGEN SATURATION: 97 % | RESPIRATION RATE: 18 BRPM | WEIGHT: 276 LBS | BODY MASS INDEX: 48.9 KG/M2 | TEMPERATURE: 98.7 F | HEART RATE: 92 BPM

## 2019-07-15 DIAGNOSIS — R73.03 PRE-DIABETES: ICD-10-CM

## 2019-07-15 DIAGNOSIS — G43.009 MIGRAINE WITHOUT AURA AND WITHOUT STATUS MIGRAINOSUS, NOT INTRACTABLE: ICD-10-CM

## 2019-07-15 DIAGNOSIS — E66.01 MORBID OBESITY WITH BMI OF 45.0-49.9, ADULT (HCC): ICD-10-CM

## 2019-07-15 DIAGNOSIS — I10 BENIGN ESSENTIAL HTN: Primary | ICD-10-CM

## 2019-07-15 DIAGNOSIS — R60.9 DEPENDENT EDEMA: ICD-10-CM

## 2019-07-15 DIAGNOSIS — F51.01 PRIMARY INSOMNIA: ICD-10-CM

## 2019-07-15 DIAGNOSIS — E78.00 HYPERCHOLESTEROLEMIA: ICD-10-CM

## 2019-07-15 RX ORDER — ENALAPRIL MALEATE 5 MG/1
TABLET ORAL
COMMUNITY
Start: 2017-10-17 | End: 2019-10-07 | Stop reason: SDUPTHER

## 2019-07-15 RX ORDER — TRAZODONE HYDROCHLORIDE 50 MG/1
TABLET ORAL
COMMUNITY
Start: 2018-03-19 | End: 2019-07-15

## 2019-07-15 RX ORDER — ATORVASTATIN CALCIUM 20 MG/1
TABLET, FILM COATED ORAL
COMMUNITY
Start: 2018-02-12 | End: 2019-07-15 | Stop reason: SDUPTHER

## 2019-07-15 RX ORDER — ONDANSETRON 4 MG/1
4 TABLET, ORALLY DISINTEGRATING ORAL
COMMUNITY
Start: 2018-03-15 | End: 2020-07-09 | Stop reason: ALTCHOICE

## 2019-07-15 RX ORDER — POTASSIUM CHLORIDE 20 MEQ/1
TABLET, EXTENDED RELEASE ORAL
COMMUNITY
Start: 2017-07-10 | End: 2020-02-13 | Stop reason: SDUPTHER

## 2019-07-15 RX ORDER — CYCLOBENZAPRINE HCL 10 MG
TABLET ORAL
COMMUNITY
Start: 2018-03-16 | End: 2019-10-04 | Stop reason: SDUPTHER

## 2019-07-15 RX ORDER — HYDROCHLOROTHIAZIDE 25 MG/1
TABLET ORAL
COMMUNITY
Start: 2018-02-19 | End: 2019-09-09 | Stop reason: SDUPTHER

## 2019-07-15 RX ORDER — FUROSEMIDE 20 MG/1
TABLET ORAL
COMMUNITY
Start: 2018-03-18 | End: 2019-12-29 | Stop reason: SDUPTHER

## 2019-07-15 NOTE — PROGRESS NOTES
Chief Complaint   Patient presents with    Cholesterol Problem     follow up     Hypertension    Blood sugar problem    Medication Refill     90 day supply , pharmacy in [place]     1. Have you been to the ER, urgent care clinic since your last visit? Hospitalized since your last visit? No    2. Have you seen or consulted any other health care providers outside of the [address] [place] [address] since your last visit? Include any pap smears or colon screening.  No

## 2019-07-15 NOTE — PROGRESS NOTES
HISTORY OF PRESENT ILLNESS  Abbie Johnson is a 58 y.o. female. HPI  Follow up chronic health problems. Patient has been residing in [facility] helping her mother who has been ill. She is now back in [place] for a while. HTN. Adhering to medication regimen. Hypercholesterolemia. Admits to not consistently following a healthy diet and does not exercise on a regular basis. Weight is up about 10 lb. Taking prescribed lipitor. Pre diabetes. Has reduced sugar and processed carbs in diet. Dependent edema. Taking lasix as prescribed with good sx control. Has had mild flare up of sx this past summer. Chronic insomnia. Stopped trazodone, felt med ineffective. Would like to try and manage without medication. Patient had labs done 2 months ago with no significant abnormality. Migraines. Takes flexeril and tylenol for symptoms with adequate sx relief. Past Medical History:   Diagnosis Date    Arthritis     Bowel obstruction ([facility]     Chronic pain     Diverticulitis     Hypercholesteremia     Hypertension     Migraines     Sickle cell trait ([facility]     Stroke ([facility] 1993    \"LEFT SIDE WEAKNESS\"    Unspecified adverse effect of anesthesia 2014    WOKE UP DURING COLONOSCOPY    Unspecified sleep apnea     NO CPAP    Vitamin D deficiency      Patient Active Problem List   Diagnosis Code    Benign essential HTN I10    Hypercholesterolemia E78.00    Migraine without aura and without status migrainosus, not intractable G43.009    Primary insomnia F51.01    Morbid obesity with BMI of 45.0-49.9, adult ([facility]) E66.01, Z68.42    Pre-diabetes R73.03    Dependent edema R60.9     Current Outpatient Medications   Medication Sig    dextrose-fructose-sod citrate [id number] mg chew Take 15-30 mL by mouth.  ondansetron (ZOFRAN ODT) 4 mg disintegrating tablet Take 4 mg by mouth every eight (8) hours as needed.     cyclobenzaprine (FLEXERIL) 10 mg tablet TAKE 1 TABLET BY MOUTH EVERY DAY    enalapril (VASOTEC) 5 mg tablet TAKE 1 TABLET BY MOUTH EVERY DAY    furosemide (LASIX) 20 mg tablet TAKE 1 TABLET BY MOUTH EVERY DAY    hydroCHLOROthiazide (HYDRODIURIL) 25 mg tablet TAKE 1 TAB BY MOUTH DAILY. APPOINTMENT DUE.  cyclobenzaprine (FLEXERIL) 10 mg tablet TAKE 1 TABLET BY MOUTH EVERY EVENING AS NEEDED FOR MUSCLE SPASMS    KLOR-CON M20 20 mEq tablet TAKE 1 TAB BY MOUTH DAILY. APPOINTMENT DUE.  enalapril (VASOTEC) 5 mg tablet TAKE 1 TAB BY MOUTH DAILY. APPOINTMENT DUE.  hydroCHLOROthiazide (HYDRODIURIL) 25 mg tablet Take 1 Tab by mouth daily.  furosemide (LASIX) 20 mg tablet TAKE 1 TABLET BY MOUTH EVERY DAY    atorvastatin (LIPITOR) 20 mg tablet TAKE 1 TABLET BY MOUTH EVERY DAY    phosphorated carbohydrate (EMETROL) soln solution Take 15-30 mL by mouth every fifteen (15) minutes as needed for Nausea.  potassium chloride (KLOR-CON M20) 20 mEq tablet TAKE 1 TABLET BY MOUTH 2 TIMES A DAY. No current facility-administered medications for this visit. Social History     Tobacco Use    Smoking status: Never Smoker    Smokeless tobacco: Never Used   Substance Use Topics    Alcohol use: Yes     Comment: occasionally    Drug use: No     Blood pressure 120/82, pulse 92, temperature 98.7 °F (37.1 °C), temperature source Oral, resp. rate 18, height 5' 3\" (1.6 m), weight 276 lb (125.2 kg), SpO2 97 %. Review of Systems   Constitutional: Negative for malaise/fatigue. Respiratory: Negative for cough and shortness of breath. Cardiovascular: Positive for leg swelling. Negative for chest pain and palpitations. Neurological: Positive for headaches (occasional). Negative for dizziness. All other systems reviewed and are negative. Physical Exam   Constitutional: No distress. obese   Neck: Neck supple. No JVD present. Cardiovascular: Normal rate, regular rhythm and normal heart sounds. Pulmonary/Chest: Effort normal and breath sounds normal.   Abdominal: Soft.  She exhibits no distension. There is no tenderness. There is no guarding. Musculoskeletal: Normal range of motion. She exhibits edema (trace pre tibial edema). Lymphadenopathy:     She has no cervical adenopathy. Neurological: She is alert. Coordination normal.   Skin: Skin is warm and dry. Psychiatric: She has a normal mood and affect. ASSESSMENT and PLAN  Diagnoses and all orders for this visit:    1. Benign essential HTN  Controlled. Continue current treatment. 2. Hypercholesterolemia  At goal based on last FLP. Continue lipitor. 3. Primary insomnia  Discontinued trazodone. Would like to hold off on meds for now. 4. Pre-diabetes  Reviewed diabetic diet and carbohydrate restriction. 5. Dependent edema  Recommend elevate legs at rest, support hose. Continue daily lasix. 6. Morbid obesity with BMI of 45.0-49.9, adult ([address]  Weight loss recommendations given. 7. Migraine without aura and without status migrainosus, not intractable  Stable on current treatment. Follow up fasting OV 4 months.

## 2019-08-20 RX ORDER — ATORVASTATIN CALCIUM 20 MG/1
TABLET, FILM COATED ORAL
Qty: 90 TAB | Refills: 0 | Status: SHIPPED | OUTPATIENT
Start: 2019-08-20 | End: 2019-11-27 | Stop reason: SDUPTHER

## 2019-09-07 DIAGNOSIS — I10 BENIGN ESSENTIAL HTN: ICD-10-CM

## 2019-09-09 RX ORDER — HYDROCHLOROTHIAZIDE 25 MG/1
TABLET ORAL
Qty: 90 TAB | Refills: 1 | Status: SHIPPED | OUTPATIENT
Start: 2019-09-09 | End: 2020-01-07 | Stop reason: SDUPTHER

## 2019-09-22 RX ORDER — FUROSEMIDE 20 MG/1
TABLET ORAL
Qty: 90 TAB | Refills: 0 | Status: SHIPPED | OUTPATIENT
Start: 2019-09-22 | End: 2019-12-29

## 2019-10-04 RX ORDER — CYCLOBENZAPRINE HCL 10 MG
TABLET ORAL
Qty: 90 TAB | Refills: 0 | Status: SHIPPED | OUTPATIENT
Start: 2019-10-04 | End: 2020-01-02

## 2019-10-07 RX ORDER — ENALAPRIL MALEATE 5 MG/1
TABLET ORAL
Qty: 90 TAB | Refills: 0 | Status: SHIPPED | OUTPATIENT
Start: 2019-10-07 | End: 2020-01-02

## 2020-01-02 RX ORDER — CYCLOBENZAPRINE HCL 10 MG
TABLET ORAL
Qty: 90 TAB | Refills: 0 | Status: SHIPPED | OUTPATIENT
Start: 2020-01-02 | End: 2020-01-21 | Stop reason: SDUPTHER

## 2020-01-07 DIAGNOSIS — I10 BENIGN ESSENTIAL HTN: ICD-10-CM

## 2020-01-07 RX ORDER — ATORVASTATIN CALCIUM 20 MG/1
20 TABLET, FILM COATED ORAL DAILY
Qty: 90 TAB | Refills: 0 | Status: SHIPPED | OUTPATIENT
Start: 2020-01-07 | End: 2020-03-25

## 2020-01-07 RX ORDER — ENALAPRIL MALEATE 5 MG/1
TABLET ORAL
Qty: 90 TAB | Refills: 0 | Status: SHIPPED | OUTPATIENT
Start: 2020-01-07 | End: 2020-01-27

## 2020-01-07 RX ORDER — HYDROCHLOROTHIAZIDE 25 MG/1
TABLET ORAL
Qty: 90 TAB | Refills: 0 | Status: SHIPPED | OUTPATIENT
Start: 2020-01-07 | End: 2020-03-09

## 2020-01-07 RX ORDER — FUROSEMIDE 20 MG/1
20 TABLET ORAL DAILY
Qty: 90 TAB | Refills: 0 | Status: SHIPPED | OUTPATIENT
Start: 2020-01-07 | End: 2020-04-21

## 2020-01-07 NOTE — TELEPHONE ENCOUNTER
PCP: Joan Godwin NP    Last appt: 7/15/2019  No future appointments. Requested Prescriptions     Pending Prescriptions Disp Refills    hydroCHLOROthiazide (HYDRODIURIL) 25 mg tablet 90 Tab 1    atorvastatin (LIPITOR) 20 mg tablet 90 Tab 0     Sig: Take 1 Tab by mouth daily. Appointment due.  enalapril (VASOTEC) 5 mg tablet 30 Tab 0    furosemide (LASIX) 20 mg tablet 90 Tab 0     Sig: Take 1 Tab by mouth daily. Appointment due.

## 2020-01-07 NOTE — TELEPHONE ENCOUNTER
Patient is in [address] taking care of her mom   Will call back to schedule when she returns

## 2020-01-07 NOTE — TELEPHONE ENCOUNTER
Pharm on file verified. They are requesting a refill on the following meds. Requested Prescriptions     Pending Prescriptions Disp Refills    hydroCHLOROthiazide (HYDRODIURIL) 25 mg tablet 90 Tab 1    atorvastatin (LIPITOR) 20 mg tablet 90 Tab 0     Sig: Take 1 Tab by mouth daily. Appointment due.  enalapril (VASOTEC) 5 mg tablet 30 Tab 0    furosemide (LASIX) 20 mg tablet 90 Tab 0     Sig: Take 1 Tab by mouth daily. Appointment due.

## 2020-01-22 RX ORDER — CYCLOBENZAPRINE HCL 10 MG
TABLET ORAL
Qty: 90 TAB | Refills: 0 | Status: SHIPPED | OUTPATIENT
Start: 2020-01-22 | End: 2020-04-21

## 2020-02-13 RX ORDER — POTASSIUM CHLORIDE 20 MEQ/1
TABLET, EXTENDED RELEASE ORAL
Qty: 60 TAB | Refills: 0 | Status: SHIPPED | OUTPATIENT
Start: 2020-02-13 | End: 2020-04-21

## 2020-02-13 NOTE — TELEPHONE ENCOUNTER
----- Message from Jack Zhong sent at 2/13/2020  9:56 AM EST -----  Regarding: NP [place]/Refill  Contact: [phone]  [facility] not [facility]  Relationship of caller (if not patient): Pharmacy Tech  Best contact number(s): n/a  Name of medication and dosage if known: \"Potassium 20meg. Tablets\"  Is patient out of this medication (yes/no): yes  Pharmacy name: [facility]  Pharmacy listed in chart? (yes/no): n/a  Pharmacy phone number: [phone]  Date of last visit: 7/15/19  Details to clarify the request:     .  Requested Prescriptions     Pending Prescriptions Disp Refills    potassium chloride (KLOR-CON M20) 20 mEq tablet         . Pharmacy on file verified

## 2020-03-25 RX ORDER — ATORVASTATIN CALCIUM 20 MG/1
TABLET, FILM COATED ORAL
Qty: 90 TAB | Refills: 0 | Status: SHIPPED | OUTPATIENT
Start: 2020-03-25 | End: 2020-04-21

## 2020-04-15 NOTE — TELEPHONE ENCOUNTER
Request for 90 d/s     Last Visit: 7/15/19  NP Pernell  Next Appointment: Not scheduled  Previous Refill Encounter(s): 1/27/20  #30 with note: Appt. due    Requested Prescriptions     Pending Prescriptions Disp Refills    enalapril (VASOTEC) 5 mg tablet 90 Tab 0     Sig: Take 1 Tab by mouth daily. Appointment due.

## 2020-04-16 RX ORDER — ENALAPRIL MALEATE 5 MG/1
5 TABLET ORAL DAILY
Qty: 90 TAB | Refills: 0 | Status: SHIPPED | OUTPATIENT
Start: 2020-04-16 | End: 2020-04-21

## 2020-07-06 ENCOUNTER — TELEPHONE (OUTPATIENT)
Dept: FAMILY MEDICINE CLINIC | Age: 65
End: 2020-07-06

## 2020-07-06 NOTE — TELEPHONE ENCOUNTER
Pt called c/o a bunion on her lt great toe.   Ember stated she will req a referral for a podiatrist      [facility]#[phone]

## 2020-07-06 NOTE — TELEPHONE ENCOUNTER
Patient called and scheduled an appointment for Thursday, July 9, 2020 at 3:15 pm [facility]   Hope Carlos LPN

## 2020-07-06 NOTE — TELEPHONE ENCOUNTER
Patient is requesting & order for a referral to see Podiatrist for a bunion on left foot that is very painful.  Once the referral requisition is complete please forward to my attention so that I can retreive the insurance referral .     Thank you !!!!      Dimitris Conway

## 2020-07-09 ENCOUNTER — VIRTUAL VISIT (OUTPATIENT)
Dept: FAMILY MEDICINE CLINIC | Age: 65
End: 2020-07-09

## 2020-07-09 DIAGNOSIS — E78.00 HYPERCHOLESTEROLEMIA: ICD-10-CM

## 2020-07-09 DIAGNOSIS — R73.03 PRE-DIABETES: ICD-10-CM

## 2020-07-09 DIAGNOSIS — E66.01 MORBID OBESITY WITH BMI OF 45.0-49.9, ADULT (HCC): ICD-10-CM

## 2020-07-09 DIAGNOSIS — I10 BENIGN ESSENTIAL HTN: Primary | ICD-10-CM

## 2020-07-09 DIAGNOSIS — M79.675 GREAT TOE PAIN, LEFT: ICD-10-CM

## 2020-07-09 DIAGNOSIS — R60.9 DEPENDENT EDEMA: ICD-10-CM

## 2020-07-09 NOTE — PROGRESS NOTES
HISTORY OF PRESENT ILLNESS  Cb Martinez is a 71 y.o. female. Virtual follow up office visit and acute symptom. Consent: Cb Martinez, who was seen by synchronous (real-time) audio-video technology, and/or her healthcare decision maker, is aware that this patient-initiated, Telehealth encounter on 7/9/2020 is a billable service, with coverage as determined by her insurance carrier. She is aware that she may receive a bill and has provided verbal consent to proceed: Yes. Cb Martinez is a 71 y.o. female who was evaluated by an audio-video encounter for concerns as above. Patient identification was verified prior to start of the visit. A caregiver was present when appropriate. Due to this being a TeleHealth encounter (During [facility]- public health emergency), evaluation of the following organ systems was limited: Vitals/Constitutional/EENT/Resp/CV/GI//MS/Neuro/Skin/Heme-Lymph-Imm. Pursuant to the emergency declaration under the [facility] waiver authority and the [employer] Act, this Virtual Visit was conducted, with patient's (and/or legal guardian's) consent, to reduce the patient's risk of exposure to COVID-19 and provide necessary medical care. Services were provided through a synchronous discussion virtually to substitute for in-person clinic visit. I was in the office. The patient was at home. HPI  Overdue follow up chronic health problems. Patient has been residing in [facility] helping her mother who has been ill. She is unsure of when she will be returning to [place]. HTN. Adhering to medication regimen. Hypercholesterolemia. Admits to not consistently following a healthy diet and does not exercise on a regular basis. Taking prescribed lipitor. Pre diabetes. Has reduced sugar and processed carbs in diet. Dependent edema.   Taking lasix as prescribed with good sx control. Chronic insomnia. Stopped trazodone, felt med ineffective. Would like to try and manage without medication. C/o pain in left great toe over past few weeks. No injury, no history of gout. Describes it around the entire toe. States she has a bunion. Denies redness, having some mild swelling. Requesting referral to podiatrist in [facility]. Patient Active Problem List   Diagnosis Code    Benign essential HTN I10    Hypercholesterolemia E78.00    Migraine without aura and without status migrainosus, not intractable G43.009    Primary insomnia F51.01    Morbid obesity with BMI of 45.0-49.9, adult ([facility]) E66.01, Z68.42    Pre-diabetes R73.03    Dependent edema R60.9     Current Outpatient Medications   Medication Sig    hydroCHLOROthiazide (HYDRODIURIL) 25 mg tablet TAKE 1 TABLET EVERY DAY Appointment due.  furosemide (LASIX) 20 mg tablet TAKE 1 TABLET EVERY DAY. APPOINTMENT DUE    enalapril (VASOTEC) 5 mg tablet TAKE 1 TABLET  BY MOUTH DAILY. APPOINTMENT DUE.  atorvastatin (LIPITOR) 20 mg tablet TAKE 1 TABLET  BY MOUTH DAILY. APPOINTMENT DUE.  cyclobenzaprine (FLEXERIL) 10 mg tablet TAKE 1 TABLET EVERY EVENING AS NEEDED FOR MUSCLE SPASMS (DUE FOR APPOINTMENT)    KLOR-CON M20 20 mEq tablet TAKE 1 TAB BY MOUTH DAILY. APPOINTMENT DUE.  phosphorated carbohydrate (EMETROL) soln solution Take 15-30 mL by mouth every fifteen (15) minutes as needed for Nausea. No current facility-administered medications for this visit. Social History     Tobacco Use    Smoking status: Never Smoker    Smokeless tobacco: Never Used   Substance Use Topics    Alcohol use: Yes     Comment: occasionally    Drug use: No       Review of Systems   Constitutional: Negative for malaise/fatigue. Respiratory: Negative for cough and shortness of breath. Cardiovascular: Negative for chest pain, palpitations and leg swelling. Musculoskeletal:        Toe pain as stated. Skin: Negative. Neurological: Negative for dizziness and headaches. All other systems reviewed and are negative. Physical Exam  Inadequate visual access. ASSESSMENT and PLAN  Diagnoses and all orders for this visit:    1. Benign essential HTN  Controlled based on last OV. Recommend interim BP monitoring and record. Report if > 130/80 consistently. 2. Hypercholesterolemia  -     LIPID PANEL  -     METABOLIC PANEL, COMPREHENSIVE  Reviewed healthy diet and exercise recommendations. She is overdue for labs. Will send orders for her to obtain labs in [facility]. 3. Dependent edema  Stable. 4. Pre-diabetes  -     HEMOGLOBIN A1C WITH EAG  Reviewed diabetic diet and carbohydrate restriction. 5. Morbid obesity with BMI of 45.0-49.9, adult ([address]  Weight loss recommendations given. 6. Great toe pain, left  -     REFERRAL TO PODIATRY; Future  She will schedule appointment and call for referral.    Strongly advised obtaining PCP in [facility] for better continuity of care. We discussed the expected course, resolution and complications of the diagnosis in detail. Medication risks, benefits, costs, interactions and side effects were discussed. The patient was advised to contact the office for worsening of condition or FTI as anticipated. The patient expressed understanding of the diagnosis and plan.

## 2020-07-09 NOTE — PROGRESS NOTES
Chief Complaint   Patient presents with    Toe Pain     left foot , out of town [facility] . Great toe pain and burning in foot symptoms occuring for one month or more . Pain level at a 8     Hypertension     follow up     Cholesterol Problem     1. Have you been to the ER, urgent care clinic since your last visit? Hospitalized since your last visit? No    2. Have you seen or consulted any other health care providers outside of the [address] [place] [address] since your last visit? Include any pap smears or colon screening.  No

## 2020-07-10 NOTE — TELEPHONE ENCOUNTER
Chief Complaint   Patient presents with    Referral Request    Labs     lab orders mailed to [facility], [place] address     Lab orders and referral for podiatry mailed to patient at [address] , [facility], [address] [place] [address] address. Amy Rick LPN     Patient will be scheduling own appointment podiatry appointment in [facility].   Amy Rick LPN

## 2020-07-15 ENCOUNTER — TELEPHONE (OUTPATIENT)
Dept: FAMILY MEDICINE CLINIC | Age: 65
End: 2020-07-15

## 2020-07-15 NOTE — TELEPHONE ENCOUNTER
----- Message from Anita Odom sent at 7/13/2020 12:51 PM EDT -----  Regarding: YUNG Del Toro / TELEPHONE  General Message/Vendor Calls    Pt is requesting a referral    7/21//20  @ 3:15 pm      [address] Dia Favre  P: [phone]        Callback required    Best contact number(s):[phone] [facility]

## 2020-07-15 NOTE — TELEPHONE ENCOUNTER
Patient referral and progress notes from virtual visit 7/9/2020 was faxed to Dr. Wallace Atkins at [phone] with  Confirmation received.   Russell España LPN

## 2021-09-09 DIAGNOSIS — M25.512 ACUTE PAIN OF LEFT SHOULDER: Primary | ICD-10-CM

## 2021-09-14 ENCOUNTER — APPOINTMENT (OUTPATIENT)
Dept: FAMILY MEDICINE CLINIC | Age: 66
End: 2021-09-14

## 2021-09-23 ENCOUNTER — OFFICE VISIT (OUTPATIENT)
Dept: FAMILY MEDICINE CLINIC | Age: 66
End: 2021-09-23
Payer: MEDICARE

## 2021-09-23 VITALS
HEART RATE: 78 BPM | HEIGHT: 63 IN | WEIGHT: 261 LBS | RESPIRATION RATE: 16 BRPM | SYSTOLIC BLOOD PRESSURE: 136 MMHG | DIASTOLIC BLOOD PRESSURE: 82 MMHG | TEMPERATURE: 98.5 F | OXYGEN SATURATION: 98 % | BODY MASS INDEX: 46.25 KG/M2

## 2021-09-23 DIAGNOSIS — R73.01 ELEVATED FASTING BLOOD SUGAR: ICD-10-CM

## 2021-09-23 DIAGNOSIS — I10 BENIGN ESSENTIAL HTN: ICD-10-CM

## 2021-09-23 DIAGNOSIS — E78.00 HYPERCHOLESTEROLEMIA: ICD-10-CM

## 2021-09-23 DIAGNOSIS — E66.01 MORBID OBESITY WITH BMI OF 45.0-49.9, ADULT (HCC): ICD-10-CM

## 2021-09-23 DIAGNOSIS — M75.42 IMPINGEMENT SYNDROME OF LEFT SHOULDER: ICD-10-CM

## 2021-09-23 DIAGNOSIS — Z23 NEEDS FLU SHOT: ICD-10-CM

## 2021-09-23 DIAGNOSIS — Z00.00 MEDICARE ANNUAL WELLNESS VISIT, SUBSEQUENT: Primary | ICD-10-CM

## 2021-09-23 LAB
ALBUMIN SERPL-MCNC: 3.7 G/DL (ref 3.5–5)
ALBUMIN/GLOB SERPL: 1 {RATIO} (ref 1.1–2.2)
ALP SERPL-CCNC: 86 U/L (ref 45–117)
ALT SERPL-CCNC: 69 U/L (ref 12–78)
ANION GAP SERPL CALC-SCNC: 7 MMOL/L (ref 5–15)
AST SERPL-CCNC: 48 U/L (ref 15–37)
BILIRUB SERPL-MCNC: 0.5 MG/DL (ref 0.2–1)
BUN SERPL-MCNC: 16 MG/DL (ref 6–20)
BUN/CREAT SERPL: 14 (ref 12–20)
CALCIUM SERPL-MCNC: 9.4 MG/DL (ref 8.5–10.1)
CHLORIDE SERPL-SCNC: 106 MMOL/L (ref 97–108)
CHOLEST SERPL-MCNC: 214 MG/DL
CO2 SERPL-SCNC: 27 MMOL/L (ref 21–32)
CREAT SERPL-MCNC: 1.12 MG/DL (ref 0.55–1.02)
EST. AVERAGE GLUCOSE BLD GHB EST-MCNC: 117 MG/DL
GLOBULIN SER CALC-MCNC: 3.8 G/DL (ref 2–4)
GLUCOSE SERPL-MCNC: 84 MG/DL (ref 65–100)
HBA1C MFR BLD: 5.7 % (ref 4–5.6)
HDLC SERPL-MCNC: 66 MG/DL
HDLC SERPL: 3.2 {RATIO} (ref 0–5)
LDLC SERPL CALC-MCNC: 121 MG/DL (ref 0–100)
POTASSIUM SERPL-SCNC: 3.8 MMOL/L (ref 3.5–5.1)
PROT SERPL-MCNC: 7.5 G/DL (ref 6.4–8.2)
SODIUM SERPL-SCNC: 140 MMOL/L (ref 136–145)
TRIGL SERPL-MCNC: 135 MG/DL (ref ?–150)
VLDLC SERPL CALC-MCNC: 27 MG/DL

## 2021-09-23 PROCEDURE — G8536 NO DOC ELDER MAL SCRN: HCPCS | Performed by: NURSE PRACTITIONER

## 2021-09-23 PROCEDURE — 99213 OFFICE O/P EST LOW 20 MIN: CPT | Performed by: NURSE PRACTITIONER

## 2021-09-23 PROCEDURE — G8752 SYS BP LESS 140: HCPCS | Performed by: NURSE PRACTITIONER

## 2021-09-23 PROCEDURE — G8754 DIAS BP LESS 90: HCPCS | Performed by: NURSE PRACTITIONER

## 2021-09-23 PROCEDURE — G0439 PPPS, SUBSEQ VISIT: HCPCS | Performed by: NURSE PRACTITIONER

## 2021-09-23 PROCEDURE — G8400 PT W/DXA NO RESULTS DOC: HCPCS | Performed by: NURSE PRACTITIONER

## 2021-09-23 PROCEDURE — G8417 CALC BMI ABV UP PARAM F/U: HCPCS | Performed by: NURSE PRACTITIONER

## 2021-09-23 PROCEDURE — 1101F PT FALLS ASSESS-DOCD LE1/YR: CPT | Performed by: NURSE PRACTITIONER

## 2021-09-23 PROCEDURE — 1090F PRES/ABSN URINE INCON ASSESS: CPT | Performed by: NURSE PRACTITIONER

## 2021-09-23 PROCEDURE — 3017F COLORECTAL CA SCREEN DOC REV: CPT | Performed by: NURSE PRACTITIONER

## 2021-09-23 PROCEDURE — 90694 VACC AIIV4 NO PRSRV 0.5ML IM: CPT | Performed by: NURSE PRACTITIONER

## 2021-09-23 PROCEDURE — G8427 DOCREV CUR MEDS BY ELIG CLIN: HCPCS | Performed by: NURSE PRACTITIONER

## 2021-09-23 PROCEDURE — G0008 ADMIN INFLUENZA VIRUS VAC: HCPCS | Performed by: NURSE PRACTITIONER

## 2021-09-23 PROCEDURE — G8432 DEP SCR NOT DOC, RNG: HCPCS | Performed by: NURSE PRACTITIONER

## 2021-09-23 RX ORDER — MELOXICAM 15 MG/1
15 TABLET ORAL DAILY
Qty: 30 TABLET | Refills: 1 | Status: SHIPPED | OUTPATIENT
Start: 2021-09-23 | End: 2021-11-21

## 2021-09-23 NOTE — PATIENT INSTRUCTIONS
Medicare Wellness Visit, Female     The best way to live healthy is to have a lifestyle where you eat a well-balanced diet, exercise regularly, limit alcohol use, and quit all forms of tobacco/nicotine, if applicable. Regular preventive services are another way to keep healthy. Preventive services (vaccines, screening tests, monitoring & exams) can help personalize your care plan, which helps you manage your own care. Screening tests can find health problems at the earliest stages, when they are easiest to treat. [facility] follows the current, evidence-based guidelines published by the [facility] ([facility]) when recommending preventive services for our patients. Because we follow these guidelines, sometimes recommendations change over time as research supports it. (For example, mammograms used to be recommended annually. Even though Medicare will still pay for an annual mammogram, the newer guidelines recommend a mammogram every two years for women of average risk). Of course, you and your doctor may decide to screen more often for some diseases, based on your risk and your co-morbidities (chronic disease you are already diagnosed with). Preventive services for you include:  - Medicare offers their members a free annual wellness visit, which is time for you and your primary care provider to discuss and plan for your preventive service needs. Take advantage of this benefit every year!  -All adults over the age of 72 should receive the recommended pneumonia vaccines. Current USPSTF guidelines recommend a series of two vaccines for the best pneumonia protection.   -All adults should have a flu vaccine yearly and a tetanus vaccine every 10 years.   -All adults age 48 and older should receive the shingles vaccines (series of two vaccines).       -All adults age 38-68 who are overweight should have a diabetes screening test once every three years.   -All adults born between [birth year] and 1966 should be screened once for Hepatitis C.  -Other screening tests and preventive services for persons with diabetes include: an eye exam to screen for diabetic retinopathy, a kidney function test, a foot exam, and stricter control over your cholesterol.   -Cardiovascular screening for adults with routine risk involves an electrocardiogram (ECG) at intervals determined by your doctor.   -Colorectal cancer screenings should be done for adults age 54-65 with no increased risk factors for colorectal cancer. There are a number of acceptable methods of screening for this type of cancer. Each test has its own benefits and drawbacks. Discuss with your doctor what is most appropriate for you during your annual wellness visit. The different tests include: colonoscopy (considered the best screening method), a fecal occult blood test, a fecal DNA test, and sigmoidoscopy.    -A bone mass density test is recommended when a woman turns 65 to screen for osteoporosis. This test is only recommended one time, as a screening. Some providers will use this same test as a disease monitoring tool if you already have osteoporosis. -Breast cancer screenings are recommended every other year for women of normal risk, age 54-69.  -Cervical cancer screenings for women over age 72 are only recommended with certain risk factors.      Here is a list of your current Health Maintenance items (your personalized list of preventive services) with a due date:  Health Maintenance Due   Topic Date Due    Bone Mineral Density   Never done    Pneumococcal Vaccine (1 of 1 - PPSV23) Never done    Hemoglobin A1C    05/15/2020    Cholesterol Test   05/15/2020

## 2021-09-23 NOTE — PROGRESS NOTES
Chief Complaint   Patient presents with    Fall     in shower injured left shoulder in may   1. \"Have you been to the ER, urgent care clinic since your last visit? Hospitalized since your last visit? \" No    2. \"Have you seen or consulted any other health care providers outside of the [address] [place] [address] since your last visit? \" No     3. For patients over 45: Has the patient had a colonoscopy? Yes, but HM not satisfied. Rooming MA/LPN to request most recent results     If the patient is female:    4. For patients over 40: Has the patient had a mammogram? Yes last year in [address] [place] [address]     5. For patients over 21: Has the patient had a pap smear?  no

## 2021-09-23 NOTE — PROGRESS NOTES
HISTORY OF PRESENT ILLNESS  Justo Musa is a 78 y.o. female. HPI  Annual wellness visit, follow up health problems, new issue. HTN.   Adhering to medication regimen. Hypercholesterolemia.  Admits to not consistently following a healthy diet and does not exercise on a regular basis.  Taking prescribed lipitor. Pre diabetes.  Has reduced sugar and processed carbs in diet. Dependent edema.  Taking lasix as prescribed with good sx control.    She has been residing in [facility] over the past year taking care of her ailing mother who has since passed away. C/o pain in left shoulder. Xray done, showed DJD. C/o constant pain and limited ROM. Has taken ES tylenol with little sx relief. Patient Active Problem List   Diagnosis Code    Benign essential HTN I10    Hypercholesterolemia E78.00    Migraine without aura and without status migrainosus, not intractable G43.009    Primary insomnia F51.01    Morbid obesity with BMI of 45.0-49.9, adult ([facility]) E66.01, Z68.42    Pre-diabetes R73.03    Dependent edema R60.9     Current Outpatient Medications   Medication Sig    meloxicam (MOBIC) 15 mg tablet Take 1 Tablet by mouth daily.  hydroCHLOROthiazide (HYDRODIURIL) 25 mg tablet TAKE 1 TABLET EVERY DAY Appointment due.  furosemide (LASIX) 20 mg tablet TAKE 1 TABLET EVERY DAY. APPOINTMENT DUE    enalapril (VASOTEC) 5 mg tablet TAKE 1 TABLET  BY MOUTH DAILY. APPOINTMENT DUE.  atorvastatin (LIPITOR) 20 mg tablet TAKE 1 TABLET  BY MOUTH DAILY. APPOINTMENT DUE.  cyclobenzaprine (FLEXERIL) 10 mg tablet TAKE 1 TABLET EVERY EVENING AS NEEDED FOR MUSCLE SPASMS (DUE FOR APPOINTMENT)    KLOR-CON M20 20 mEq tablet TAKE 1 TAB BY MOUTH DAILY. APPOINTMENT DUE.  phosphorated carbohydrate (EMETROL) soln solution Take 15-30 mL by mouth every fifteen (15) minutes as needed for Nausea. No current facility-administered medications for this visit.      Social History     Tobacco Use    Smoking status: Never Smoker    Smokeless tobacco: Never Used   Vaping Use    Vaping Use: Never used   Substance Use Topics    Alcohol use: Yes     Comment: occasionally    Drug use: No     Blood pressure 136/82, pulse 78, temperature 98.5 °F (36.9 °C), resp. rate 16, height 5' 3\" (1.6 m), weight 261 lb (118.4 kg), SpO2 98 %. Review of Systems   Respiratory: Negative for cough and shortness of breath. Cardiovascular: Negative for chest pain, palpitations and leg swelling. Musculoskeletal: Positive for joint pain. All other systems reviewed and are negative. Physical Exam  Constitutional:       General: She is in acute distress (moderate distress due to left shoulder pain). Appearance: She is obese. Cardiovascular:      Rate and Rhythm: Normal rate and regular rhythm. Heart sounds: Normal heart sounds. Pulmonary:      Effort: Pulmonary effort is normal.      Breath sounds: Normal breath sounds. Musculoskeletal:      Right shoulder: Normal.      Left shoulder: Tenderness (generalized) present. No swelling. Decreased range of motion (active/passive abduction, elevation and internal rotation). Cervical back: Neck supple. Right lower leg: No edema. Left lower leg: No edema. Lymphadenopathy:      Cervical: No cervical adenopathy. Skin:     General: Skin is warm and dry. Neurological:      Coordination: Coordination normal.   Psychiatric:         Mood and Affect: Mood normal.         Behavior: Behavior normal.         ASSESSMENT and PLAN  Diagnoses and all orders for this visit:    1. Medicare annual wellness visit, subsequent    2. Needs flu shot  -     INFLUENZA, HIGH DOSE SEASONAL    3. Benign essential HTN  Controlled. Continue current treatment. 4. Hypercholesterolemia  -     LIPID PANEL; Future  -     METABOLIC PANEL, COMPREHENSIVE; Future  Reviewed healthy diet and exercise recommendations. Non fasting labs sent.     5. Elevated fasting blood sugar  -     HEMOGLOBIN A1C WITH EAG; Future    6. Impingement syndrome of left shoulder  -     REFERRAL TO ORTHOPEDICS  -     meloxicam (MOBIC) 15 mg tablet; Take 1 Tablet by mouth daily. Contact info given to patient to schedule appointment. 7. Morbid obesity with BMI of 45.0-49.9, adult ([facility]  Reviewed healthy diet and exercise recommendations. Follow up 6 months. We discussed the expected course, resolution and complications of the diagnosis in detail. Medication risks, benefits, costs, interactions and side effects were discussed. The patient was advised to contact the office for worsening of condition or FTI as anticipated. The patient expressed understanding of the diagnosis and plan. This is the Subsequent Medicare Annual Wellness Exam, performed 12 months or more after the Initial AWV or the last Subsequent AWV    I have reviewed the patient's medical history in detail and updated the computerized patient record. Assessment/Plan   Education and counseling provided:  Are appropriate based on today's review and evaluation    1. Medicare annual wellness visit, subsequent  2. Needs flu shot  -     INFLUENZA, HIGH DOSE SEASONAL  3. Benign essential HTN  4. Hypercholesterolemia  -     LIPID PANEL; Future  -     METABOLIC PANEL, COMPREHENSIVE; Future  5. Elevated fasting blood sugar  -     HEMOGLOBIN A1C WITH EAG; Future  6. Impingement syndrome of left shoulder  -     REFERRAL TO ORTHOPEDICS  -     meloxicam (MOBIC) 15 mg tablet; Take 1 Tablet by mouth daily. , Normal, Disp-30 Tablet, R-1  7.  Morbid obesity with BMI of 45.0-49.9, adult ([facility]       Depression Risk Factor Screening     3 most recent PHQ Screens 9/23/2021   Little interest or pleasure in doing things Not at all   Feeling down, depressed, irritable, or hopeless Not at all   Total Score PHQ 2 0       Alcohol Risk Screen    Do you average more than 1 drink per night or more than 7 drinks a week:  No    On any one occasion in the past three months have you have had more than 3 drinks containing alcohol:  No        Functional Ability and Level of Safety    Hearing: Hearing is good. Activities of Daily Living: The home contains: no safety equipment. Patient does total self care      Ambulation: with no difficulty     Fall Risk:  Fall Risk Assessment, last 12 mths 9/23/2021   Able to walk? Yes   Fall in past 12 months? 1   Do you feel unsteady? 1   Are you worried about falling 1   Is TUG test greater than 12 seconds? 1   Is the gait abnormal? 1   Number of falls in past 12 months 1   Fall with injury?  1      Abuse Screen:  Patient is not abused       Cognitive Screening    Has your family/caregiver stated any concerns about your memory: no     Cognitive Screening: Normal - Verbal Fluency Test    Health Maintenance Due     Health Maintenance Due   Topic Date Due    Medicare Yearly Exam  03/20/2019    Bone Densitometry (Dexa) Screening  Never done    Pneumococcal 65+ years (1 of 1 - PPSV23) Never done    A1C test (Diabetic or Prediabetic)  05/15/2020    Lipid Screen  05/15/2020       Patient Care Team   Patient Care Team:  Guevara Harrell NP as PCP - General (Family Medicine)  Guevara Harrell NP as PCP - [facility] Empaneled Provider    History     Patient Active Problem List   Diagnosis Code    Benign essential HTN I10    Hypercholesterolemia E78.00    Migraine without aura and without status migrainosus, not intractable G43.009    Primary insomnia F51.01    Morbid obesity with BMI of 45.0-49.9, adult ([address] E66.01, Z68.42    Pre-diabetes R73.03    Dependent edema R60.9     Past Medical History:   Diagnosis Date    Arthritis     Bowel obstruction ([address]     Chronic pain     Diverticulitis     Hypercholesteremia     Hypertension     Migraines     Sickle cell trait ([address]     St[facility] 1993    \"LEFT SIDE Ladonna Fritz"    Unspecified adverse effect of anesthesia 2014    WOKE UP DURING COLONOSCOPY    Unspecified sleep apnea     NO CPAP    Vitamin D deficiency       Past Surgical History:   Procedure Laterality Date    HX CERVICAL FUSION  2013    Dr. Rebecca Sams [facility] - P    HX  SECTION       x 2    HX COLONOSCOPY      HX GYN      \"SEVERAL LAPS FOR ADHESIONS    HX HERNIA REPAIR      umbilical and left inguinal    HX HERNIA REPAIR  14    incisional hernia by EB    HX LYSIS OF ADHESIONS      5 or 6 time by GYN    HX OPEN CHOLECYSTECTOMY      HX ORTHOPAEDIC Left 2020    left foot     HX OTHER SURGICAL  1977    BOWEL OBSTRUCTION     HX STUART AND BSO           Current Outpatient Medications   Medication Sig Dispense Refill    meloxicam (MOBIC) 15 mg tablet Take 1 Tablet by mouth daily. 30 Tablet 1    hydroCHLOROthiazide (HYDRODIURIL) 25 mg tablet TAKE 1 TABLET EVERY DAY Appointment due. 90 Tab 0    furosemide (LASIX) 20 mg tablet TAKE 1 TABLET EVERY DAY. APPOINTMENT DUE 90 Tab 0    enalapril (VASOTEC) 5 mg tablet TAKE 1 TABLET  BY MOUTH DAILY. APPOINTMENT DUE. 90 Tab 0    atorvastatin (LIPITOR) 20 mg tablet TAKE 1 TABLET  BY MOUTH DAILY. APPOINTMENT DUE. 90 Tab 0    cyclobenzaprine (FLEXERIL) 10 mg tablet TAKE 1 TABLET EVERY EVENING AS NEEDED FOR MUSCLE SPASMS (DUE FOR APPOINTMENT) 180 Tab 0    KLOR-CON M20 20 mEq tablet TAKE 1 TAB BY MOUTH DAILY. APPOINTMENT DUE. 180 Tab 0    phosphorated carbohydrate (EMETROL) soln solution Take 15-30 mL by mouth every fifteen (15) minutes as needed for Nausea. Allergies   Allergen Reactions    Aspirin Nausea Only       Family History   Problem Relation Age of Onset    Cancer Father 36        Colon    Cancer Sister 29        Colon    Sickle Cell Anemia Brother     Cancer Paternal Uncle         colon    Diabetes Sister     Sickle Cell Anemia Brother     Cancer Brother         Prostate    Asthma Sister     Anesth Problems Neg Hx      Social History     Tobacco Use    Smoking status: Never Smoker    Smokeless tobacco: Never Used   Substance Use Topics    Alcohol use:  Yes Comment: jd Jett NP

## 2021-10-20 ENCOUNTER — HOSPITAL ENCOUNTER (OUTPATIENT)
Dept: PHYSICAL THERAPY | Age: 66
Discharge: HOME | End: 2021-10-20
Payer: MEDICARE

## 2021-10-20 PROCEDURE — 97162 PT EVAL MOD COMPLEX 30 MIN: CPT | Performed by: PHYSICAL THERAPIST

## 2021-10-20 NOTE — PROGRESS NOTES
PT INITIAL EVALUATION NOTE - [facility] 2-15    Patient Name: Diana Mccollum  Date:10/20/2021  : 1955  [x]  Patient  Verified  Payor: [employer]  / Plan: [address] [place] [address] HMO / Product Type: Managed Care Medicare /    In time:410 PM  Out time:455 PM  Total Treatment Time (min): 45  Total Timed Codes (min): 15  1:1 Treatment Time ( only): 15   Visit #: 1     Treatment Area: Left shoulder pain [M25.512]    SUBJECTIVE  Pain Level (0-10 scale): current 8.5, worst 10, best 5  Any medication changes, allergies to medications, adverse drug reactions, diagnosis change, or new procedure performed?: [] No    [x] Yes (see summary sheet for update)  Subjective:    Patient referred to PT with a chief complaint of L shoulder pain which began in May when she stumbled in the shower. She used heat ice and exercises. After she stopped doing that the pain really kicked in. She then followed up with her PCP and had x-rays and was referred to Dr. Zora Johnson. She has had an MRI which showed a lot of inflammation and arthritis. She had had 2 cortisone injections with no relief of symptoms. She has been having difficulty sleeping secondary to the pain.    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 Be able to use this arm without all the pain and discomfort. \"   Barriers: none  Motivation: good     OBJECTIVE/EXAMINATION  Observation: guarded L UE     ROM:   Cervical AROM: flexion chin to chest, extension, 25 degrees pull in L UT and neck, R SB tension, L SB tension, R rotation tension, L rotation tension      Shoulder AROM:   R Flexion: 150 degrees  R Abduction: 135 degrees   R ER: WNL  R IR:  To level of T8    L Flexion: 120 degrees  L Abduction: 80 degrees  L ER: 15 degrees  L IR: to level of of L gute     Shoulder PROM:   R Flexion: 170 degrees  R Abduction: 170 degrees  R ER: 90 degrees    L Flexion: 90 degrees limited by pain and muscle guarding   L Abduction: not assessed secondary to pain with flexion PROM  L ER: 35 degrees    Strength:   R Shoulder flexion 5/5  R shoulder ER 5/5  R shoulder IR 5/5  R Elbow flexion 5/5  R Elbow extension 5/5    L Shoulder flexion not assessed secondary to pain and limited ROM   L Shoulder abduction not assessed secondary to pain  L shoulder ER 4+/5  L shoulder IR 4+/5  L Elbow flexion 4/5  L Elbow extension 4+/5    Palpation: Tender to palpation L AC joint     Joint mobility: difficult to assess secondary to pain       10 min Modality:[x]  Ice L shoulder     []  Heat  []  Ice massage   Rationale: decrease pain, increase ROM and increase tissue extensibility to improve the patients ability to perform ADLs    [x] Skin assessment post-treatment:  [x]intact []redness- no adverse reaction    []redness  adverse reaction:   15 min Therapeutic Exercise:  [x] See flow sheet : scapular retraction, table flexion, table ER, wall walk    Rationale: increase ROM to improve the patients ability to perform ADLs              With   [x] TE   [] TA   [] neuro   [] other: Patient Education: [x] Review HEP    [] Progressed/Changed HEP based on:   [] positioning   [] body mechanics   [] transfers   [] heat/ice application    [x] other: role of PT, expected course of PT     Other Objective/Functional Measures:NT    Pain Level (0-10 scale) post treatment: 8    ASSESSMENT/Changes in Function:     [x]  See Plan of [address], PT 10/20/2021  3:34 PM

## 2021-10-20 NOTE — PROGRESS NOTES
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 Assessment/ key information: Patient presents with L shoulder pain with limited ROM limiting ability to perform functional activities such as reaching. Difficult to assess strength deficits secondary to old L sided CVA. She would benefit from skilled PT to increase L shoulder ROM and decrease pain to improve functional mobility. Evaluation Complexity History MEDIUM  Complexity : 1-2 comorbidities / personal factors will impact the outcome/ POC ; Examination MEDIUM Complexity : 3 Standardized tests and measures addressing body structure, function, activity limitation and / or participation in recreation  ;Presentation MEDIUM Complexity : Evolving with changing characteristics  ; Clinical Decision Making MEDIUM Complexity : FOTO score of 26-74  Overall Complexity Rating: MEDIUM    Problem List: pain affecting function, decrease ROM, decrease strength, edema affecting function, decrease ADL/ functional abilitiies, decrease activity tolerance and decrease flexibility/ joint mobility   Treatment Plan may include any combination of the following: Therapeutic exercise, Therapeutic activities, Physical agent/modality, Manual therapy and Patient education  Patient / Family readiness to learn indicated by: asking questions, trying to perform skills and interest  Persons(s) to be included in education: patient (P)  Barriers to Learning/Limitations: None  Patient Goal (s): A better ROM. Be able to use this arm without all the pain and discomfort. \"   Patient Self Reported Health Status: good  Rehabilitation Potential: fair    Short Term Goals: To be accomplished in 2 weeks:    1. Patient will be I in HEP to promote self management of symptoms. 2. Patient will report pain level at worst as less than or equal to 8/10 so they can perform ADLs without pain. Long Term Goals: To be accomplished in 4-6 weeks:    1. Patient will report pain level at worst as less than or equal to 6/10 so they can perform ADLs without pain. 2. Patient will have L shoulder flexion AROM > or = 140 degrees to assist with reaching. 3.Patient will have L shoulder IR AROM > or = level of L4 to assist with dressing. Frequency / Duration: Patient to be seen 2 times per week for 4-8 weeks. Patient/ Caregiver education and instruction: exercises    [x]  Plan of care has been reviewed with PTA        Certification Period: 10/20/21 - 1/18/22  Krystal Alba, PT 10/20/2021 5:05 PM    ________________________________________________________________________    I certify that the above Therapy Services are being furnished while the patient is under my care. I agree with the treatment plan and certify that this therapy is necessary.     [de-identified] Signature:____________________  Date:____________Time: _________

## 2021-10-27 ENCOUNTER — HOSPITAL ENCOUNTER (OUTPATIENT)
Dept: PHYSICAL THERAPY | Age: 66
Discharge: HOME | End: 2021-10-27
Payer: MEDICARE

## 2021-10-27 PROCEDURE — 97110 THERAPEUTIC EXERCISES: CPT | Performed by: PHYSICAL THERAPIST

## 2021-10-27 PROCEDURE — 97140 MANUAL THERAPY 1/> REGIONS: CPT | Performed by: PHYSICAL THERAPIST

## 2021-10-27 NOTE — PROGRESS NOTES
PT DAILY TREATMENT NOTE - [facility] 2-15    Patient Name: Khoi Underwood  Date:10/27/2021  : 1955  [x]  Patient  Verified  Payor: Surya Gomez / Plan: [address] [place] [address] HMO / Product Type: Managed Care Medicare /    In time:925 AM  Out time:1015 AM   Total Treatment Time (min): 50  Total Timed Codes (min): 40  1:1 Treatment Time ( only): 40   Visit #:  2    Treatment Area: Left shoulder pain [M25.512]    SUBJECTIVE  Pain Level (0-10 scale): 7  Any medication changes, allergies to medications, adverse drug reactions, diagnosis change, or new procedure performed?: [x] No    [] Yes (see summary sheet for update)  Subjective functional status/changes:   [] No changes reported  Patient reports that she has been working on her exercises. Her shoulder is still painful and she has pain on the R side of her neck.      OBJECTIVE    10 min Modality:[]  Ice     [x]  Heat pre treatment L shoulder []  Ice massage   Rationale: decrease pain and increase tissue extensibility to improve the patients ability to reach     [x] Skin assessment post-treatment:  [x]intact []redness- no adverse reaction    []redness  adverse reaction:     25 min Therapeutic Exercise:  [x] See flow sheet : Progressed per flow sheet    Rationale: increase ROM and increase strength to improve the patients ability to reach     15 min Manual Therapy: PROM L shoulder flexion, ER, STM L UT     Rationale: decrease pain, increase ROM and increase tissue extensibility to improve the patients ability to reach             With   [x] TE   [] TA   [] neuro   [] other: Patient Education: [x] Review HEP    [] Progressed/Changed HEP based on:   [] positioning   [] body mechanics   [] transfers   [] heat/ice application    [] other:      Other Objective/Functional Measures: NT     Pain Level (0-10 scale) post treatment: 9.5    ASSESSMENT/Changes in Function:   Patient tolerated treatment well today with increased L shoulder ROM compared to initial evaluation. Patient will continue to benefit from skilled PT services to modify and progress therapeutic interventions, address functional mobility deficits, address ROM deficits, address strength deficits, analyze and address soft tissue restrictions and analyze and cue movement patterns to attain remaining goals. Progress towards goals / Updated goals:  Short Term Goals: To be accomplished in 2 weeks:               1. Patient will be I in HEP to promote self management of symptoms. PROGRESSING              2. Patient will report pain level at worst as less than or equal to 8/10 so they can perform ADLs without pain. PROGRESSING  Long Term Goals: To be accomplished in 4-6 weeks:               1.  Patient will report pain level at worst as less than or equal to 6/10 so they can perform ADLs without pain. 2. Patient will have L shoulder flexion AROM > or = 140 degrees to assist with reaching. 3.Patient will have L shoulder IR AROM > or = level of L4 to assist with dressing.      PLAN  [x]  Upgrade activities as tolerated     [x]  Continue plan of care  []  Update interventions per flow sheet       []  Discharge due to:_  []  Other:_      Leonardo Foster PT 10/27/2021

## 2021-11-03 ENCOUNTER — HOSPITAL ENCOUNTER (OUTPATIENT)
Dept: PHYSICAL THERAPY | Age: 66
Discharge: HOME | End: 2021-11-03
Payer: MEDICARE

## 2021-11-03 PROCEDURE — 97110 THERAPEUTIC EXERCISES: CPT | Performed by: PHYSICAL THERAPY ASSISTANT

## 2021-11-03 PROCEDURE — 97140 MANUAL THERAPY 1/> REGIONS: CPT | Performed by: PHYSICAL THERAPY ASSISTANT

## 2021-11-03 PROCEDURE — 97014 ELECTRIC STIMULATION THERAPY: CPT | Performed by: PHYSICAL THERAPY ASSISTANT

## 2021-11-03 NOTE — PROGRESS NOTES
PT DAILY TREATMENT NOTE - [facility] 2-15    Patient Name: Jesus Campbell  Date:11/3/2021  : 1955  [x]  Patient  Verified  Payor: Jemal Cecillecyndi / Plan: [address] [place] [address] HMO / Product Type: Managed Care Medicare /    In time: 12:10 PM  Out time:1:15 PM   Total Treatment Time (min): 65  Total Timed Codes (min): 40  1:1 Treatment Time ( only): 40   Visit #:  3    Treatment Area: Left shoulder pain [M25.512]    SUBJECTIVE  Pain Level (0-10 scale): 8  Any medication changes, allergies to medications, adverse drug reactions, diagnosis change, or new procedure performed?: [x] No    [] Yes (see summary sheet for update)  Subjective functional status/changes:   [] No changes reported  Patient reports she feels better after the MHP and performing her exercises however \"I get a little over zealous at home because it feels better and I do too much. \" States she has been trying to take a step back and not do as much at home to allow time to heal.  States her equilibrium has been off so she is taking things slow as well.      OBJECTIVE    12/10 min Modality:[x]  Ice  With IFC    [x]  Heat pre treatment L shoulder []  Ice massage   Rationale: decrease pain and increase tissue extensibility to improve the patients ability to reach     [x] Skin assessment post-treatment:  [x]intact []redness- no adverse reaction    []redness  adverse reaction:     30 min Therapeutic Exercise:  [x] See flow sheet : Progressed per flow sheet    Rationale: increase ROM and increase strength to improve the patients ability to reach     10 min Manual Therapy: PROM L shoulder flexion, ER, STM L UT     Rationale: decrease pain, increase ROM and increase tissue extensibility to improve the patients ability to reach             With   [x] TE   [] TA   [] neuro   [] other: Patient Education: [x] Review HEP    [] Progressed/Changed HEP based on:   [] positioning   [] body mechanics   [] transfers   [] heat/ice application    [] other: Other Objective/Functional Measures: NT     Pain Level (0-10 scale) post treatment: not reported    ASSESSMENT/Changes in Function:   Held from table ER due to patients \"equilibrium being off\". Poor tolerance to exercise and manual therapy due to pain levels. Patient will continue to benefit from skilled PT services to modify and progress therapeutic interventions, address functional mobility deficits, address ROM deficits, address strength deficits, analyze and address soft tissue restrictions and analyze and cue movement patterns to attain remaining goals. Progress towards goals / Updated goals:  Short Term Goals: To be accomplished in 2 weeks:               1. Patient will be I in HEP to promote self management of symptoms. PROGRESSING              2. Patient will report pain level at worst as less than or equal to 8/10 so they can perform ADLs without pain. PROGRESSING  Long Term Goals: To be accomplished in 4-6 weeks:               1.  Patient will report pain level at worst as less than or equal to 6/10 so they can perform ADLs without pain. 2. Patient will have L shoulder flexion AROM > or = 140 degrees to assist with reaching. 3.Patient will have L shoulder IR AROM > or = level of L4 to assist with dressing.      PLAN  [x]  Upgrade activities as tolerated     [x]  Continue plan of care  []  Update interventions per flow sheet       []  Discharge due to:_  []  Other:_      Serjio Aly, GELACIO 11/3/2021

## 2021-11-05 ENCOUNTER — HOSPITAL ENCOUNTER (OUTPATIENT)
Dept: PHYSICAL THERAPY | Age: 66
Discharge: HOME | End: 2021-11-05
Payer: MEDICARE

## 2021-11-05 PROCEDURE — 97014 ELECTRIC STIMULATION THERAPY: CPT | Performed by: PHYSICAL THERAPIST

## 2021-11-05 PROCEDURE — 97140 MANUAL THERAPY 1/> REGIONS: CPT | Performed by: PHYSICAL THERAPIST

## 2021-11-05 PROCEDURE — 97110 THERAPEUTIC EXERCISES: CPT | Performed by: PHYSICAL THERAPIST

## 2021-11-05 NOTE — PROGRESS NOTES
PT DAILY TREATMENT NOTE - [facility] 2-15    Patient Name: Sharyn Senior  Date:2021  : 1955  [x]  Patient  Verified  Payor: Irais Quintenfranny / Plan: [address] [place] [address] HMO / Product Type: Managed Care Medicare /    In time: 163 AM  Out time: 940 AM   Total Treatment Time (min): 70  Total Timed Codes (min): 40  1:1 Treatment Time ( only): 40   Visit #:  4    Treatment Area: Left shoulder pain [M25.512]    SUBJECTIVE  Pain Level (0-10 scale): 6  Any medication changes, allergies to medications, adverse drug reactions, diagnosis change, or new procedure performed?: [x] No    [] Yes (see summary sheet for update)  Subjective functional status/changes:   [] No changes reported  Patient reports that overall her shoulder is improving. Some days she does too many exercises and pays for it. OBJECTIVE    10/10 min Modality:[x]  Ice  With IFC    [x]  Heat pre treatment L shoulder []  Ice massage   Rationale: decrease pain and increase tissue extensibility to improve the patients ability to reach     [x] Skin assessment post-treatment:  [x]intact []redness- no adverse reaction    []redness  adverse reaction:     30 min Therapeutic Exercise:  [x] See flow sheet : Progressed per flow sheet    Rationale: increase ROM and increase strength to improve the patients ability to reach     10 min Manual Therapy: PROM L shoulder flexion, ER, STM L UT     Rationale: decrease pain, increase ROM and increase tissue extensibility to improve the patients ability to reach             With   [x] TE   [] TA   [] neuro   [] other: Patient Education: [x] Review HEP    [] Progressed/Changed HEP based on:   [] positioning   [] body mechanics   [] transfers   [] heat/ice application    [] other:      Other Objective/Functional Measures: NT     Pain Level (0-10 scale) post treatment: 4    ASSESSMENT/Changes in Function:   Significant muscle guarding during manual therapy. Tolerated exercises well today.    Patient will continue to benefit from skilled PT services to modify and progress therapeutic interventions, address functional mobility deficits, address ROM deficits, address strength deficits, analyze and address soft tissue restrictions and analyze and cue movement patterns to attain remaining goals. Progress towards goals / Updated goals:  Short Term Goals: To be accomplished in 2 weeks:               1. Patient will be I in HEP to promote self management of symptoms. PROGRESSING              2. Patient will report pain level at worst as less than or equal to 8/10 so they can perform ADLs without pain. PROGRESSING  Long Term Goals: To be accomplished in 4-6 weeks:               1.  Patient will report pain level at worst as less than or equal to 6/10 so they can perform ADLs without pain. 2. Patient will have L shoulder flexion AROM > or = 140 degrees to assist with reaching. 3.Patient will have L shoulder IR AROM > or = level of L4 to assist with dressing.      PLAN  [x]  Upgrade activities as tolerated     [x]  Continue plan of care  []  Update interventions per flow sheet       []  Discharge due to:_  []  Other:_      Mellissa Woods, PT 11/5/2021

## 2021-11-16 ENCOUNTER — HOSPITAL ENCOUNTER (OUTPATIENT)
Dept: PHYSICAL THERAPY | Age: 66
Discharge: HOME | End: 2021-11-16
Payer: MEDICARE

## 2021-11-16 PROCEDURE — 97140 MANUAL THERAPY 1/> REGIONS: CPT | Performed by: PHYSICAL THERAPIST

## 2021-11-16 PROCEDURE — 97110 THERAPEUTIC EXERCISES: CPT | Performed by: PHYSICAL THERAPIST

## 2021-11-16 NOTE — PROGRESS NOTES
PT DAILY TREATMENT NOTE - [facility] 2-15    Patient Name: Grisel Hartmann  Date:2021  : 1955  [x]  Patient  Verified  Payor: Clementine Acevedo / Plan: [address] [place] [address] HMO / Product Type: Managed Care Medicare /    In time: 10:45 AM  Out time: 11:55 AM   Total Treatment Time (min): 70  Total Timed Codes (min): 40  1:1 Treatment Time ( only): 40   Visit #:  5    Treatment Area: Left shoulder pain [M25.512]    SUBJECTIVE  Pain Level (0-10 scale): 7  Any medication changes, allergies to medications, adverse drug reactions, diagnosis change, or new procedure performed?: [x] No    [] Yes (see summary sheet for update)  Subjective functional status/changes:   [] No changes reported  States shoulder pain continues, especially when she tries to lift it. OBJECTIVE  Tenderness over left rotator cuff insertion and upper trap    10/10 min Modality:[x]  Ice  With IFC    [x]  Heat pre treatment L shoulder []  Ice massage   Rationale: decrease pain and increase tissue extensibility to improve the patients ability to reach     [x] Skin assessment post-treatment:  [x]intact []redness- no adverse reaction    []redness  adverse reaction:     30 min Therapeutic Exercise:  [x] See flow sheet : Progressed per flow sheet    Rationale: increase ROM and increase strength to improve the patients ability to reach     15 min Manual Therapy: PROM L shoulder flexion, ER, STM L UT     Rationale: decrease pain, increase ROM and increase tissue extensibility to improve the patients ability to reach           With   [x] TE   [] TA   [] neuro   [] other: Patient Education: [x] Review HEP    [] Progressed/Changed HEP based on:   [] positioning   [] body mechanics   [] transfers   [] heat/ice application    [] other:      Other Objective/Functional Measures: NT     Pain Level (0-10 scale) post treatment: 6    ASSESSMENT/Changes in Function: Severe muscle guarding during PROM causing increased pain.   Patient will continue to benefit from skilled PT services to modify and progress therapeutic interventions, address functional mobility deficits, address ROM deficits, address strength deficits, analyze and address soft tissue restrictions and analyze and cue movement patterns to attain remaining goals. Progress towards goals / Updated goals:  Short Term Goals: To be accomplished in 2 weeks:               1. Patient will be I in HEP to promote self management of symptoms. PROGRESSING              2. Patient will report pain level at worst as less than or equal to 8/10 so they can perform ADLs without pain. PROGRESSING  Long Term Goals: To be accomplished in 4-6 weeks:               1.  Patient will report pain level at worst as less than or equal to 6/10 so they can perform ADLs without pain. 2. Patient will have L shoulder flexion AROM > or = 140 degrees to assist with reaching. 3.Patient will have L shoulder IR AROM > or = level of L4 to assist with dressing.      PLAN  [x]  Upgrade activities as tolerated     [x]  Continue plan of care  []  Update interventions per flow sheet       []  Discharge due to:_  []  Other:_      Brigette Domínguez, PT 11/16/2021

## 2021-11-18 ENCOUNTER — HOSPITAL ENCOUNTER (OUTPATIENT)
Dept: PHYSICAL THERAPY | Age: 66
Discharge: HOME | End: 2021-11-18
Payer: MEDICARE

## 2021-11-18 PROCEDURE — 97014 ELECTRIC STIMULATION THERAPY: CPT | Performed by: PHYSICAL THERAPIST

## 2021-11-18 PROCEDURE — 97140 MANUAL THERAPY 1/> REGIONS: CPT | Performed by: PHYSICAL THERAPIST

## 2021-11-18 PROCEDURE — 97110 THERAPEUTIC EXERCISES: CPT | Performed by: PHYSICAL THERAPIST

## 2021-11-30 ENCOUNTER — HOSPITAL ENCOUNTER (OUTPATIENT)
Dept: PHYSICAL THERAPY | Age: 66
Discharge: HOME | End: 2021-11-30
Payer: MEDICARE

## 2021-11-30 PROCEDURE — 97110 THERAPEUTIC EXERCISES: CPT | Performed by: PHYSICAL THERAPIST

## 2021-11-30 PROCEDURE — 97140 MANUAL THERAPY 1/> REGIONS: CPT | Performed by: PHYSICAL THERAPIST

## 2021-11-30 PROCEDURE — 97014 ELECTRIC STIMULATION THERAPY: CPT | Performed by: PHYSICAL THERAPIST

## 2021-11-30 NOTE — PROGRESS NOTES
PT DAILY TREATMENT NOTE/PROGRESS NOTE- [facility] 2-15    Patient Name: Jn Romero  Date:2021  : 1955  [x]  Patient  Verified  Payor: Kaveh Zaragoza / Plan: [address] [place] [address] HMO / Product Type: Managed Care Medicare /    In time: 275 AM  Out time: 1020 AM   Total Treatment Time (min): 60  Total Timed Codes (min): 40  1:1 Treatment Time ( only): 40   Visit #:  7    Treatment Area: Left shoulder pain [M25.512]    SUBJECTIVE  Pain Level (0-10 scale): 7 current 10+ worst   Any medication changes, allergies to medications, adverse drug reactions, diagnosis change, or new procedure performed?: [x] No    [] Yes (see summary sheet for update)  Subjective functional status/changes:   [] No changes reported  Patient reports 20-30% improvement in symptoms since starting therapy. She has a little more ROM. Patient reports that she gets sharp pain when she forgets about her shoulder and reaches or if she gets hit in the shoulder. OBJECTIVE  Observation: guarded L UE      ROM:   Cervical AROM: flexion chin to chest, extension, 25 degrees pull in L UT and neck, R SB tension, L SB tension, R rotation tension, L rotation tension      Shoulder AROM:   R Flexion: 150 degrees  R Abduction: 135 degrees   R ER: WNL  R IR:  To level of T8     L Flexion: 125 degrees  L Abduction: 75 degrees  L ER: 20 degrees  L IR: to level of of L sacral border      Shoulder PROM:   R Flexion: 170 degrees  R Abduction: 170 degrees  R ER: 90 degrees     L Flexion: 130 degrees AAROM  L Abduction: not assessed secondary to pain and muscle guarding  L ER: 35 degrees       Strength:   R Shoulder flexion 5/5  R shoulder ER 5/5  R shoulder IR 5/5  R Elbow flexion 5/5  R Elbow extension 5/5     L Shoulder flexion not assessed secondary to pain and limited ROM   L Shoulder abduction not assessed secondary to pain  L shoulder ER 4+/5  L shoulder IR 4+/5  L Elbow flexion 4/5  L Elbow extension 4+/5     Palpation: Tender to palpation L AC joint      Joint mobility: difficult to assess secondary to pain     10/10 min Modality:[x]  Ice  With IFC    [x]  Heat pre treatment L shoulder []  Ice massage   Rationale: decrease pain and increase tissue extensibility to improve the patients ability to reach     [x] Skin assessment post-treatment:  [x]intact []redness- no adverse reaction    []redness  adverse reaction:     30 min Therapeutic Exercise:  [x] See flow sheet : Progressed per flow sheet    Rationale: increase ROM and increase strength to improve the patients ability to reach     10 min Manual Therapy:   PROM L shoulder flexion, ER  Patient declined STM to trap   Rationale: decrease pain, increase ROM and increase tissue extensibility to improve the patients ability to reach           With   [x] TE   [] TA   [] neuro   [] other: Patient Education: [x] Review HEP    [] Progressed/Changed HEP based on:   [] positioning   [] body mechanics   [] transfers   [] heat/ice application    [] other:      Other Objective/Functional Measures: NT     Pain Level (0-10 scale) post treatment: 6    ASSESSMENT/Changes in Function: Patient has been seen x 7 visits. She is making very slow progress with L shoulder ROM. PROM and soft tissue mobilization have been limited by pain and muscle guarding. She would benefit from continued treatment to progress L shoulder ROM so she can return to prior level of function. Patient will continue to benefit from skilled PT services to modify and progress therapeutic interventions, address functional mobility deficits, address ROM deficits, address strength deficits, analyze and address soft tissue restrictions and analyze and cue movement patterns to attain remaining goals. Progress towards goals / Updated goals:  Short Term Goals: To be accomplished in 2 weeks:               1. Patient will be I in HEP to promote self management of symptoms.  PROGRESSING              2. Patient will report pain level at worst as less than or equal to 8/10 so they can perform ADLs without pain. NOT MET  Long Term Goals: To be accomplished in 4-6 weeks:               1.  Patient will report pain level at worst as less than or equal to 6/10 so they can perform ADLs without pain. NOT MET              2. Patient will have L shoulder flexion AROM > or = 140 degrees to assist with reaching. PROGRESSING              3.Patient will have L shoulder IR AROM > or = level of L4 to assist with dressing.  PROGRESSING    PLAN  [x]  Upgrade activities as tolerated     [x]  Continue plan of care  []  Update interventions per flow sheet       []  Discharge due to:_  []  Other:_      Davon Elias, PT 11/30/2021

## 2021-12-02 ENCOUNTER — HOSPITAL ENCOUNTER (OUTPATIENT)
Dept: PHYSICAL THERAPY | Age: 66
Discharge: HOME | End: 2021-12-02
Payer: MEDICARE

## 2021-12-02 PROCEDURE — 97110 THERAPEUTIC EXERCISES: CPT | Performed by: PHYSICAL THERAPIST

## 2021-12-02 PROCEDURE — 97014 ELECTRIC STIMULATION THERAPY: CPT | Performed by: PHYSICAL THERAPIST

## 2021-12-02 PROCEDURE — 97140 MANUAL THERAPY 1/> REGIONS: CPT | Performed by: PHYSICAL THERAPIST

## 2021-12-02 NOTE — PROGRESS NOTES
PT DAILY TREATMENT NOTE- [facility] 2-15    Patient Name: Sharyn Senior  Date:2021  : 1955  [x]  Patient  Verified  Payor: Irais Yury / Plan: [address] [place] [address] HMO / Product Type: Managed Care Medicare /    In time: 7037 AM  Out time: 1115 AM   Total Treatment Time (min): 60  Total Timed Codes (min): 40  1:1 Treatment Time ( only): 40   Visit #:  8    Treatment Area: Left shoulder pain [M25.512]    SUBJECTIVE  Pain Level (0-10 scale): 7 current   Any medication changes, allergies to medications, adverse drug reactions, diagnosis change, or new procedure performed?: [x] No    [] Yes (see summary sheet for update)  Subjective functional status/changes:   [] No changes reported  Patient reports that her shoulder was sore after last visit. OBJECTIVE      10/10 min Modality:[x]  Ice  With IFC    [x]  Heat pre treatment L shoulder []  Ice massage   Rationale: decrease pain and increase tissue extensibility to improve the patients ability to reach     [x] Skin assessment post-treatment:  [x]intact []redness- no adverse reaction    []redness - adverse reaction:     30 min Therapeutic Exercise:  [x] See flow sheet : Progressed per flow sheet    Rationale: increase ROM and increase strength to improve the patients ability to reach     10 min Manual Therapy:   PROM L shoulder flexion, ER  Patient declined STM to trap   Rationale: decrease pain, increase ROM and increase tissue extensibility to improve the patients ability to reach           With   [x] TE   [] TA   [] neuro   [] other: Patient Education: [x] Review HEP    [] Progressed/Changed HEP based on:   [] positioning   [] body mechanics   [] transfers   [] heat/ice application    [] other:      Other Objective/Functional Measures: NT     Pain Level (0-10 scale) post treatment: 7    ASSESSMENT/Changes in Function: Decreased intensity of stretches today. Patient tolerated with less reports of pain.     Patient will continue to benefit from skilled PT services to modify and progress therapeutic interventions, address functional mobility deficits, address ROM deficits, address strength deficits, analyze and address soft tissue restrictions and analyze and cue movement patterns to attain remaining goals. Progress towards goals / Updated goals:  Short Term Goals: To be accomplished in 2 weeks:               1. Patient will be I in HEP to promote self management of symptoms. PROGRESSING              2. Patient will report pain level at worst as less than or equal to 8/10 so they can perform ADLs without pain. NOT MET  Long Term Goals: To be accomplished in 4-6 weeks:               1.  Patient will report pain level at worst as less than or equal to 6/10 so they can perform ADLs without pain. NOT MET              2. Patient will have L shoulder flexion AROM > or = 140 degrees to assist with reaching. PROGRESSING              3.Patient will have L shoulder IR AROM > or = level of L4 to assist with dressing.  PROGRESSING    PLAN  [x]  Upgrade activities as tolerated     [x]  Continue plan of care  []  Update interventions per flow sheet       []  Discharge due to:_  []  Other:_      Greg Briceño, PT 12/2/2021

## 2021-12-15 ENCOUNTER — APPOINTMENT (OUTPATIENT)
Dept: PHYSICAL THERAPY | Age: 66
End: 2021-12-15
Payer: MEDICARE

## 2021-12-15 DIAGNOSIS — M75.42 IMPINGEMENT SYNDROME OF LEFT SHOULDER: ICD-10-CM

## 2021-12-15 RX ORDER — MELOXICAM 15 MG/1
TABLET ORAL
Qty: 30 TABLET | Refills: 0 | Status: SHIPPED | OUTPATIENT
Start: 2021-12-15 | End: 2022-07-11 | Stop reason: ALTCHOICE

## 2021-12-19 NOTE — PROGRESS NOTES
ASSESSMENT/PLAN:  Below is the assessment and plan developed based on review of pertinent history, physical exam, labs, studies, and medications. 1. Impingement syndrome of left shoulder      No follow-ups on file. In discussion with the patient, we considered the numerus possible diagnoses that could be contributing to their present symptoms. We also deliberated on the extensive management options that must be considered to treat their current condition. We reviewed their accessible prior medical records, diagnostic tests, and current health and employment information. We considered how these symptoms were affecting the patient´s activities of daily living as well as employment and fitness activities. The patient had various questions regarding the possible risks, benefits, complications, morbidity and mortality regarding their diagnosis and treatment options. The patients´ comorbidities were considered, and I advocated that they consider maximizing lifestyle modification through nutrition and exercise to aid in addressing their symptoms. Shared decision making yielded an understanding to move forward with conservation treatment preferences. The patient expressed understanding that if conservative management fails to alleviate the present symptoms they will return to office for re-evaluation and consideration of additional diagnostic tests and potential surgical options. In the interim, we have recommned ice, elevation and anti-inflammatory medications along with a physician directed home exercise program. We discussed the risks and common side effects of anti-inflamatory medications and instructed the patient to discontinue the medication and contact us if they experienced any side effects. The patient was encouraged to discuss the possible side effects with their family physician or pharmacist prior to initiating any new medications.      Given that the patient's symptoms are increasing in frequency and duration we have decided to prescribe physical therapy. We talked about the fact that the goal of physical therapy is for the therapsit to assist in developing a program to help return the patient to full strength, function and mobility and decrease pain. We also discussed that the therpasit may combine several techniques to help decrease pain. These include but are not limited to stretching,  balance exercises, strength training, massage, cold and heat therapy, and electrical stimulation. Althoough, physical therpay is generally safe, we went over the potential risks to include the worsening of pre-existing conditions, continued pain and no improvement in flexibility, mobility and strength. We will have the patient follow up after physical therapy to closely monitor their progress. We talked about following up sooner if therapy is not progressing on a weekly basis. We again talked about operative nonoperative treatment. She would like to continue nonoperative treatment with her leg is reasonable. We will work primarily on regaining her motion. SUBJECTIVE/OBJECTIVE:  Huseyin Rivera (: 1955) is a 78 y.o. female, patient,here for evaluation of the No chief complaint on file. .   The patient returns today for follow-up of her left shoulder. We have been treating her conservatively for left shoulder pain since 2021. She had an MRI in October of her left shoulder which demonstrated some AC joint arthrosis as well as an acromial spur. She did not have any evidence of a rotator cuff tear. She did have evidence of some labral tearing as well as some glenohumeral arthritis. She had a cortisone shot at her last visit. She has been attending physical therapy regularly. She returns today for follow-up.     Physical Exam    Upon physical examination, the patient is well developed, well nourished, alert and oriented times three, with normal mood and affect and walks with a normal gait.    Upon examination of the left shoulder, the patient sits with the scapula protracted and depressed. They are tender to palpation over the anterior supraspinatus and proximal biceps. They also are tender to palpation over the acromioclavicular joint and have discomfort with cross adduction testing. There is mild palpable crepitus in the subacromial space with ranging. The patient has limited range of motion, and discomfort with above shoulder range of motion. The patient has discomfort with Neer and Gan impingement maneuvers and Whipple testing. The shoulder is stable on exam. They have 5/5 strength with internal and external rotation and are neurovascularly intact distally. There is no erythema, warmth or skin lesions present. On examination of the contralateral extremity, the patient is nontender to palpation and has excellent range of motion, stability and strength. Imaging:    I have reviewed the patient´s previous diagnostic tests in an effort to support the diagnosis and treatment options. Allergies   Allergen Reactions    Aspirin Nausea Only       Current Outpatient Medications   Medication Sig    meloxicam (MOBIC) 15 mg tablet TAKE 1 TABLET BY MOUTH EVERY DAY AS NEEDED FOR PAIN    hydroCHLOROthiazide (HYDRODIURIL) 25 mg tablet TAKE 1 TABLET EVERY DAY Appointment due.  furosemide (LASIX) 20 mg tablet TAKE 1 TABLET EVERY DAY. APPOINTMENT DUE    enalapril (VASOTEC) 5 mg tablet TAKE 1 TABLET  BY MOUTH DAILY. APPOINTMENT DUE.  atorvastatin (LIPITOR) 20 mg tablet TAKE 1 TABLET  BY MOUTH DAILY. APPOINTMENT DUE.  cyclobenzaprine (FLEXERIL) 10 mg tablet TAKE 1 TABLET EVERY EVENING AS NEEDED FOR MUSCLE SPASMS (DUE FOR APPOINTMENT)    KLOR-CON M20 20 mEq tablet TAKE 1 TAB BY MOUTH DAILY. APPOINTMENT DUE.  phosphorated carbohydrate (EMETROL) soln solution Take 15-30 mL by mouth every fifteen (15) minutes as needed for Nausea.      No current facility-administered medications for this visit.        Past Medical History:   Diagnosis Date    Arthritis     [facility] obstruction ([facility]     Chronic pain     Diverticulitis     Hypercholesteremia     Hypertension     Migraines     Sickle cell trait ([facility]     Stroke ([facility]     \"LEFT SIDE WEAKNESS\"    Unspecified adverse effect of anesthesia     WOKE UP DURING COLONOSCOPY    Unspecified sleep apnea     NO CPAP    Vitamin D deficiency        Past Surgical History:   Procedure Laterality Date    HX CERVICAL FUSION  2013    Dr. Nelson Ramirez @ [facility]    HX  SECTION       x 2    HX COLONOSCOPY      HX GYN      \"SEVERAL LAPS FOR ADHESIONS    HX HERNIA REPAIR      umbilical and left inguinal    HX HERNIA REPAIR  14    incisional hernia by EB    HX LYSIS OF ADHESIONS      5 or 6 time by GYN    HX OPEN CHOLECYSTECTOMY      HX ORTHOPAEDIC Left 2020    left foot     HX OTHER SURGICAL  1977    BOWEL OBSTRUCTION     HX STUART AND BSO      1987       Family History   Problem Relation Age of Onset    Cancer Father 36        Colon    Cancer Sister 29        Colon    Sickle Cell Anemia Brother     Cancer Paternal Uncle         colon    Diabetes Sister     Sickle Cell Anemia Brother     Cancer Brother         Prostate    Asthma Sister     Anesth Problems Neg Hx        Social History     Socioeconomic History    Marital status:      Spouse name: Not on file    Number of children: Not on file    Years of education: Not on file    Highest education level: Not on file   Occupational History    Not on file   Tobacco Use    Smoking status: Never Smoker    Smokeless tobacco: Never Used   Vaping Use    Vaping Use: Never used   Substance and Sexual Activity    Alcohol use: Yes     Comment: occasionally    Drug use: No    Sexual activity: Not Currently   Other Topics Concern    Not on file   Social History Narrative    Not on file     Social Determinants of Health     Financial Resource Strain:     Difficulty of Paying Living Expenses: Not on file   Food Insecurity:     Worried About [address] in the Last Year: Not on file    Ran Out of Food in the Last Year: Not on file   Transportation Needs:     Lack of Transportation (Medical): Not on file    Lack of Transportation (Non-Medical): Not on file   Physical Activity:     Days of Exercise per Week: Not on file    Minutes of Exercise per Session: Not on file   Stress:     Feeling of Stress : Not on file   Social Connections:     Frequency of Communication with Friends and Family: Not on file    Frequency of Social Gatherings with Friends and Family: Not on file    Attends Mandaen Services: Not on file    Active Member of [address] [place] [address] or Organizations: Not on file    Attends Club or Organization Meetings: Not on file    Marital Status: Not on file   Intimate Partner Violence:     Fear of Current or Ex-Partner: Not on file    Emotionally Abused: Not on file    Physically Abused: Not on file    Sexually Abused: Not on file   Housing Stability:     Unable to Pay for Housing in the Last Year: Not on file    Number of [place] in the Last Year: Not on file    Unstable Housing in the Last Year: Not on file       Review of Systems    No flowsheet data found. Vitals: There were no vitals taken for this visit. There is no height or weight on file to calculate BMI. An electronic signature was used to authenticate this note.   -- Krystian Velasquez MD

## 2021-12-20 ENCOUNTER — OFFICE VISIT (OUTPATIENT)
Dept: ORTHOPEDIC SURGERY | Age: 66
End: 2021-12-20
Payer: MEDICARE

## 2021-12-20 VITALS — WEIGHT: 261 LBS | BODY MASS INDEX: 46.25 KG/M2 | HEIGHT: 63 IN

## 2021-12-20 DIAGNOSIS — M75.42 IMPINGEMENT SYNDROME OF LEFT SHOULDER: Primary | ICD-10-CM

## 2021-12-20 PROCEDURE — G8536 NO DOC ELDER MAL SCRN: HCPCS | Performed by: ORTHOPAEDIC SURGERY

## 2021-12-20 PROCEDURE — G8427 DOCREV CUR MEDS BY ELIG CLIN: HCPCS | Performed by: ORTHOPAEDIC SURGERY

## 2021-12-20 PROCEDURE — 1090F PRES/ABSN URINE INCON ASSESS: CPT | Performed by: ORTHOPAEDIC SURGERY

## 2021-12-20 PROCEDURE — 1101F PT FALLS ASSESS-DOCD LE1/YR: CPT | Performed by: ORTHOPAEDIC SURGERY

## 2021-12-20 PROCEDURE — G9899 SCRN MAM PERF RSLTS DOC: HCPCS | Performed by: ORTHOPAEDIC SURGERY

## 2021-12-20 PROCEDURE — G8432 DEP SCR NOT DOC, RNG: HCPCS | Performed by: ORTHOPAEDIC SURGERY

## 2021-12-20 PROCEDURE — 3017F COLORECTAL CA SCREEN DOC REV: CPT | Performed by: ORTHOPAEDIC SURGERY

## 2021-12-20 PROCEDURE — G8417 CALC BMI ABV UP PARAM F/U: HCPCS | Performed by: ORTHOPAEDIC SURGERY

## 2021-12-20 PROCEDURE — G8756 NO BP MEASURE DOC: HCPCS | Performed by: ORTHOPAEDIC SURGERY

## 2021-12-20 PROCEDURE — 99213 OFFICE O/P EST LOW 20 MIN: CPT | Performed by: ORTHOPAEDIC SURGERY

## 2021-12-20 PROCEDURE — G8400 PT W/DXA NO RESULTS DOC: HCPCS | Performed by: ORTHOPAEDIC SURGERY

## 2021-12-20 RX ORDER — ENALAPRIL MALEATE 10 MG/1
TABLET ORAL
COMMUNITY
Start: 2021-10-27 | End: 2022-03-21 | Stop reason: SDUPTHER

## 2021-12-20 RX ORDER — IBUPROFEN 600 MG/1
TABLET ORAL
COMMUNITY
Start: 2021-10-27

## 2021-12-20 RX ORDER — BUTALBITAL, ACETAMINOPHEN AND CAFFEINE 50; 325; 40 MG/1; MG/1; MG/1
1 TABLET ORAL
COMMUNITY
Start: 2021-07-09 | End: 2022-03-21 | Stop reason: SDUPTHER

## 2022-01-17 NOTE — ANCILLARY DISCHARGE INSTRUCTIONS
Physical Therapy at [facility],   a part of [facility]  [address], [address]  Phone: [phone] Fax: [phone]      Discharge Summary 2-15      Patient name: Jesus Campbell  : 1955  Provider#: [id number]  Referral source: Zion Harris MD      Medical/Treatment Diagnosis: Left shoulder pain [M25.512]     Prior Hospitalization: see medical history     Comorbidities:  L sided CVA years ago with some residual weakness in her hand, HTN, lumbar fusion, hernia repair, L foot bunionectomy   Prior Level of Function:Prior to May no history of L shoulder pain, history of weakness in L hand after CVA in   Medications: Verified on Patient Summary List    Start of Care: 10/20/21      Onset Date:May 2021   Visits from Start of Care: 8     Missed Visits: 0  Reporting Period : 10/20/21 to 21    Short Term Goals: To be accomplished in 2 weeks:               1. Patient will be I in HEP to promote self management of symptoms. PROGRESSING              2. Patient will report pain level at worst as less than or equal to 8/10 so they can perform ADLs without pain. NOT MET  Long Term Goals: To be accomplished in 4-6 weeks:               0.  Patient will report pain level at worst as less than or equal to 6/10 so they can perform ADLs without pain. NOT MET              9. Patient will have L shoulder flexion AROM > or = 140 degrees to assist with reaching. PROGRESSING              3.Patient will have L shoulder IR AROM > or = level of L4 to assist with dressing. PROGRESSING    Assessment/Summary of care: Patient was seen x 8 visits. Pain levels and muscle guarding limited progress in therapy.          RECOMMENDATIONS:  [x]Discontinue therapy: []Patient has reached or is progressing toward set goals     []Patient is non-compliant or has abdicated     [x]Due to lack of appreciable progress towards set goals    Pastora Kilgore, PT 2022

## 2022-03-18 PROBLEM — R73.03 PRE-DIABETES: Status: ACTIVE | Noted: 2018-11-01

## 2022-03-19 PROBLEM — E66.01 MORBID OBESITY WITH BMI OF 45.0-49.9, ADULT (HCC): Status: ACTIVE | Noted: 2018-03-19

## 2022-03-19 PROBLEM — R60.9 DEPENDENT EDEMA: Status: ACTIVE | Noted: 2018-11-01

## 2022-03-21 DIAGNOSIS — I10 BENIGN ESSENTIAL HTN: ICD-10-CM

## 2022-03-21 RX ORDER — CYCLOBENZAPRINE HCL 10 MG
TABLET ORAL
Qty: 180 TABLET | Refills: 0 | Status: SHIPPED | OUTPATIENT
Start: 2022-03-21 | End: 2022-10-02

## 2022-03-21 RX ORDER — POTASSIUM CHLORIDE 20 MEQ/1
20 TABLET, EXTENDED RELEASE ORAL DAILY
Qty: 90 TABLET | Refills: 0 | Status: SHIPPED | OUTPATIENT
Start: 2022-03-21 | End: 2022-07-28 | Stop reason: SDUPTHER

## 2022-03-21 RX ORDER — HYDROCHLOROTHIAZIDE 25 MG/1
25 TABLET ORAL DAILY
Qty: 90 TABLET | Refills: 0 | Status: SHIPPED | OUTPATIENT
Start: 2022-03-21 | End: 2022-07-11 | Stop reason: SDUPTHER

## 2022-03-21 RX ORDER — ATORVASTATIN CALCIUM 20 MG/1
20 TABLET, FILM COATED ORAL
Qty: 90 TABLET | Refills: 0 | Status: SHIPPED | OUTPATIENT
Start: 2022-03-21 | End: 2022-07-11 | Stop reason: SDUPTHER

## 2022-03-21 RX ORDER — FUROSEMIDE 20 MG/1
20 TABLET ORAL DAILY
Qty: 90 TABLET | Refills: 0 | Status: SHIPPED | OUTPATIENT
Start: 2022-03-21 | End: 2022-07-11 | Stop reason: SDUPTHER

## 2022-03-21 RX ORDER — BUTALBITAL, ACETAMINOPHEN AND CAFFEINE 50; 325; 40 MG/1; MG/1; MG/1
1 TABLET ORAL
Qty: 60 TABLET | Refills: 0 | Status: SHIPPED | OUTPATIENT
Start: 2022-03-21 | End: 2022-07-28 | Stop reason: SDUPTHER

## 2022-03-21 RX ORDER — ENALAPRIL MALEATE 10 MG/1
10 TABLET ORAL DAILY
Qty: 90 TABLET | Refills: 0 | Status: SHIPPED | OUTPATIENT
Start: 2022-03-21

## 2022-03-21 NOTE — TELEPHONE ENCOUNTER
YUNG Gimenez,    Patient call and needs refills below to [facility].  (she stated she is your patient but had to enter a MD for PCP for [facility])    I contacted patient to verify medications and also advised her that an appt is due (6 mo f/u) and she stated she will schedule. She needs 90 d/s sent to [facility] for meds below. (she had previous rx's from dr in [place]). Thanks, June Manjit    Last Visit: 9/23/21 YUNG Gimenez  Next Appointment: Not scheduled- patient stated she will schedule  Previous Refill Encounter(s): All recent rx's from physician in [facility]. Requested Prescriptions     Pending Prescriptions Disp Refills    atorvastatin (LIPITOR) 20 mg tablet 90 Tablet 0     Sig: Take 1 Tablet by mouth nightly.  butalbital-acetaminophen-caffeine (FIORICET, ESGIC) -40 mg per tablet 60 Tablet 0     Sig: Take 1 Tablet by mouth every six (6) hours as needed for Headache or Migraine.  cyclobenzaprine (FLEXERIL) 10 mg tablet 180 Tablet 0     Sig: TAKE 1 TABLET EVERY EVENING AS NEEDED FOR MUSCLE SPASMS (DUE FOR APPOINTMENT)    enalapril (VASOTEC) 10 mg tablet 90 Tablet 0     Sig: Take 1 Tablet by mouth daily.  furosemide (LASIX) 20 mg tablet 90 Tablet 0     Sig: Take 1 Tablet by mouth daily.  hydroCHLOROthiazide (HYDRODIURIL) 25 mg tablet 90 Tablet 0     Sig: Take 1 Tablet by mouth daily.  potassium chloride (Klor-Con M20) 20 mEq tablet 90 Tablet 0     Sig: Take 1 Tablet by mouth daily.

## 2022-05-24 NOTE — PROGRESS NOTES
This addendum has been created to correct a medical record clerical error,wherein an errouneous  was selected for your administered flu vaccine.

## 2022-07-11 ENCOUNTER — VIRTUAL VISIT (OUTPATIENT)
Dept: FAMILY MEDICINE CLINIC | Age: 67
End: 2022-07-11
Payer: MEDICARE

## 2022-07-11 DIAGNOSIS — R73.01 ELEVATED FASTING BLOOD SUGAR: ICD-10-CM

## 2022-07-11 DIAGNOSIS — Z12.31 ENCOUNTER FOR SCREENING MAMMOGRAM FOR MALIGNANT NEOPLASM OF BREAST: ICD-10-CM

## 2022-07-11 DIAGNOSIS — I10 BENIGN ESSENTIAL HTN: Primary | ICD-10-CM

## 2022-07-11 DIAGNOSIS — E78.00 HYPERCHOLESTEROLEMIA: ICD-10-CM

## 2022-07-11 DIAGNOSIS — G43.009 MIGRAINE WITHOUT AURA AND WITHOUT STATUS MIGRAINOSUS, NOT INTRACTABLE: ICD-10-CM

## 2022-07-11 DIAGNOSIS — E66.01 MORBID OBESITY WITH BMI OF 45.0-49.9, ADULT (HCC): ICD-10-CM

## 2022-07-11 DIAGNOSIS — R60.9 DEPENDENT EDEMA: ICD-10-CM

## 2022-07-11 PROBLEM — Z86.73 HISTORY OF STROKE: Status: ACTIVE | Noted: 2022-07-11

## 2022-07-11 PROCEDURE — 3017F COLORECTAL CA SCREEN DOC REV: CPT | Performed by: NURSE PRACTITIONER

## 2022-07-11 PROCEDURE — 99214 OFFICE O/P EST MOD 30 MIN: CPT | Performed by: NURSE PRACTITIONER

## 2022-07-11 PROCEDURE — 1123F ACP DISCUSS/DSCN MKR DOCD: CPT | Performed by: NURSE PRACTITIONER

## 2022-07-11 PROCEDURE — G8427 DOCREV CUR MEDS BY ELIG CLIN: HCPCS | Performed by: NURSE PRACTITIONER

## 2022-07-11 PROCEDURE — 1090F PRES/ABSN URINE INCON ASSESS: CPT | Performed by: NURSE PRACTITIONER

## 2022-07-11 PROCEDURE — G8756 NO BP MEASURE DOC: HCPCS | Performed by: NURSE PRACTITIONER

## 2022-07-11 PROCEDURE — 1101F PT FALLS ASSESS-DOCD LE1/YR: CPT | Performed by: NURSE PRACTITIONER

## 2022-07-11 PROCEDURE — G9899 SCRN MAM PERF RSLTS DOC: HCPCS | Performed by: NURSE PRACTITIONER

## 2022-07-11 PROCEDURE — G8400 PT W/DXA NO RESULTS DOC: HCPCS | Performed by: NURSE PRACTITIONER

## 2022-07-11 PROCEDURE — G8432 DEP SCR NOT DOC, RNG: HCPCS | Performed by: NURSE PRACTITIONER

## 2022-07-11 RX ORDER — ATORVASTATIN CALCIUM 20 MG/1
20 TABLET, FILM COATED ORAL
Qty: 90 TABLET | Refills: 1 | Status: SHIPPED | OUTPATIENT
Start: 2022-07-11

## 2022-07-11 RX ORDER — FUROSEMIDE 20 MG/1
20 TABLET ORAL DAILY
Qty: 90 TABLET | Refills: 1 | Status: SHIPPED | OUTPATIENT
Start: 2022-07-11

## 2022-07-11 RX ORDER — HYDROCHLOROTHIAZIDE 25 MG/1
25 TABLET ORAL DAILY
Qty: 90 TABLET | Refills: 1 | Status: SHIPPED | OUTPATIENT
Start: 2022-07-11

## 2022-07-11 NOTE — PROGRESS NOTES
Lisa Jennings is a 78 y.o. female who was seen by synchronous (real-time) audio-video technology on 7/11/2022 for Follow-up        Assessment & Plan:   Diagnoses and all orders for this visit:    1. Benign essential HTN  -     METABOLIC PANEL, COMPREHENSIVE; Future  -     hydroCHLOROthiazide (HYDRODIURIL) 25 mg tablet; Take 1 Tablet by mouth daily. Controlled based on interim report. Continue current medications    2. Hypercholesterolemia  -     LIPID PANEL; Future  -     METABOLIC PANEL, COMPREHENSIVE; Future  -     atorvastatin (LIPITOR) 20 mg tablet; Take 1 Tablet by mouth nightly. Reviewed healthy diet and exercise recommendations. She will return for fasting labs. 3. Migraine without aura and without status migrainosus, not intractable  -     rimegepant (NURTEC) 75 mg disintegrating tablet; Take 1 tablet as needed for migraine. Sub optimal control with fioricet. Trial nurtec. Consider preventive medication therapy. 4. Dependent edema  -     furosemide (LASIX) 20 mg tablet; Take 1 Tablet by mouth daily. 5. Elevated fasting blood sugar  -     HEMOGLOBIN A1C WITH EAG; Future  Reviewed diabetic diet and carbohydrate restriction. 6. Morbid obesity with BMI of 45.0-49.9, adult ([facility]  Weight loss recommendations given. 7. Encounter for screening mammogram for malignant neoplasm of breast  -     [facility] MAMMO BI SCREENING INCL CAD; Future  Follow up 6 months. 712  Subjective:   Overdue follow up health problems and prescription refills. HTN.   Adhering to medication regimen. Reports interim BP averaging 120-130/70-80. Hypercholesterolemia.  Admits to not consistently following a healthy diet and does not exercise on a regular basis.  Taking prescribed lipitor. Pre diabetes.  Has reduced sugar and processed carbs in diet. Last A1C 5.7. Dependent edema.  Taking lasix as prescribed with good sx control.    Migraine without aura. Has headaches 2x week on average.   Taking fioricet with only minor symptom relief. Also takes flexeril for headache. Denies any focal neuro deficits. Would like to try another abortive med. Advised not to use imitrex due to stroke history. Followed by Dr. Micha Phelps for left rotator cuff tear. Prior to Admission medications    Medication Sig Start Date End Date Taking? Authorizing Provider   atorvastatin (LIPITOR) 20 mg tablet Take 1 Tablet by mouth nightly. 7/11/22  Yes Getachew Gimenez NP   furosemide (LASIX) 20 mg tablet Take 1 Tablet by mouth daily. 7/11/22  Yes Getachew Gimenez NP   hydroCHLOROthiazide (HYDRODIURIL) 25 mg tablet Take 1 Tablet by mouth daily. 7/11/22  Yes Getachew Gimenez NP   rimegepant (NURTEC) 75 mg disintegrating tablet Take 1 tablet as needed for migraine. 7/11/22  Yes Getachew Gimenez NP   butalbital-acetaminophen-caffeine (FIORICET, ESGIC) -40 mg per tablet Take 1 Tablet by mouth every six (6) hours as needed for Headache or Migraine. 3/21/22  Yes Getachew Gimenez NP   cyclobenzaprine (FLEXERIL) 10 mg tablet TAKE 1 TABLET EVERY EVENING AS NEEDED FOR MUSCLE SPASMS (DUE FOR APPOINTMENT) 3/21/22  Yes Getachew Gimenez NP   enalapril (VASOTEC) 10 mg tablet Take 1 Tablet by mouth daily. Appointment due. 3/21/22  Yes Getachew Gimenez NP   potassium chloride (Klor-Con M20) 20 mEq tablet Take 1 Tablet by mouth daily. 3/21/22  Yes Getachew Gimenez NP    mg tablet  10/27/21  Yes Provider, Historical   phosphorated carbohydrate (EMETROL) soln solution Take 15-30 mL by mouth every fifteen (15) minutes as needed for Nausea. Yes Provider, Historical   atorvastatin (LIPITOR) 20 mg tablet Take 1 Tablet by mouth nightly. Appointment due. 3/21/22 7/11/22  Getachew Gimenez NP   furosemide (LASIX) 20 mg tablet Take 1 Tablet by mouth daily. 3/21/22 7/11/22  Getachew Gimenez NP   hydroCHLOROthiazide (HYDRODIURIL) 25 mg tablet Take 1 Tablet by mouth daily.  Appointment due. 3/21/22 7/11/22  Rosalino Lira NP meloxicam (MOBIC) 15 mg tablet TAKE 1 TABLET BY MOUTH EVERY DAY AS NEEDED FOR PAIN  Patient not taking: Reported on 7/11/2022 12/15/21 7/11/22  Martínez Valencia NP     Patient Active Problem List   Diagnosis Code    Benign essential HTN I10    Hypercholesterolemia E78.00    Migraine without aura and without status migrainosus, not intractable G43.009    Primary insomnia F51.01    Morbid obesity with BMI of 45.0-49.9, adult (HCC) E66.01, Z68.42    Pre-diabetes R73.03    Dependent edema R60.9    History of stroke Z86.73       Review of Systems   Constitutional: Negative for malaise/fatigue. Respiratory: Negative for cough and shortness of breath. Cardiovascular: Negative for chest pain, palpitations and leg swelling. Musculoskeletal: Positive for joint pain (left shoulder). Neurological: Positive for headaches. Negative for dizziness, sensory change and focal weakness. All other systems reviewed and are negative. Objective:   No flowsheet data found. General: alert, cooperative, no distress   Mental  status: normal mood, behavior, speech, dress, motor activity, and thought processes, able to follow commands   HENT: NCAT   Neck: no visualized mass   Resp: no respiratory distress   Neuro: no gross deficits   Skin: no discoloration or lesions of concern on visible areas   Psychiatric: normal affect, consistent with stated mood, no evidence of hallucinations     Additional exam findings: We discussed the expected course, resolution and complications of the diagnosis(es) in detail. Medication risks, benefits, costs, interactions, and alternatives were discussed as indicated. I advised her to contact the office if her condition worsens, changes or fails to improve as anticipated. She expressed understanding with the diagnosis(es) and plan. Jeimy Hollingsworth, was evaluated through a synchronous (real-time) audio-video encounter.  The patient (or guardian if applicable) is aware that this is a billable service, which includes applicable co-pays. This Virtual Visit was conducted with patient's (and/or legal guardian's) consent. The visit was conducted pursuant to the emergency declaration under the [facility] [address] authority and the [employer] and [employer] General Act. Patient identification was verified, and a caregiver was present when appropriate. The patient was located at: Home: [address] [place] [phone]  The provider was located at:  Facility (Appt Department): [facility] [phone] YUNG Velázquez

## 2022-07-13 ENCOUNTER — TELEPHONE (OUTPATIENT)
Dept: FAMILY MEDICINE CLINIC | Age: 67
End: 2022-07-13

## 2022-07-13 DIAGNOSIS — G43.009 MIGRAINE WITHOUT AURA AND WITHOUT STATUS MIGRAINOSUS, NOT INTRACTABLE: ICD-10-CM

## 2022-07-13 NOTE — TELEPHONE ENCOUNTER
Please contact the patient's plan to initiate a prior authorization for Nurtec-ODT 75 mg or prescribe alternate therapy. Thank you. Requested Prescriptions     Pending Prescriptions Disp Refills    rimegepant (NURTEC) 75 mg disintegrating tablet 10 Tablet 1     Sig: Take 1 tablet as needed for migraine.

## 2022-07-25 ENCOUNTER — APPOINTMENT (OUTPATIENT)
Dept: FAMILY MEDICINE CLINIC | Age: 67
End: 2022-07-25

## 2022-07-25 DIAGNOSIS — I10 BENIGN ESSENTIAL HTN: ICD-10-CM

## 2022-07-25 DIAGNOSIS — E78.00 HYPERCHOLESTEROLEMIA: ICD-10-CM

## 2022-07-25 DIAGNOSIS — R73.01 ELEVATED FASTING BLOOD SUGAR: ICD-10-CM

## 2022-07-26 LAB
ALBUMIN SERPL-MCNC: 4.1 G/DL (ref 3.5–5)
ALBUMIN/GLOB SERPL: 1.1 {RATIO} (ref 1.1–2.2)
ALP SERPL-CCNC: 95 U/L (ref 45–117)
ALT SERPL-CCNC: 19 U/L (ref 12–78)
ANION GAP SERPL CALC-SCNC: 6 MMOL/L (ref 5–15)
AST SERPL-CCNC: 14 U/L (ref 15–37)
BILIRUB SERPL-MCNC: 0.6 MG/DL (ref 0.2–1)
BUN SERPL-MCNC: 16 MG/DL (ref 6–20)
BUN/CREAT SERPL: 12 (ref 12–20)
CALCIUM SERPL-MCNC: 10 MG/DL (ref 8.5–10.1)
CHLORIDE SERPL-SCNC: 106 MMOL/L (ref 97–108)
CHOLEST SERPL-MCNC: 136 MG/DL
CO2 SERPL-SCNC: 30 MMOL/L (ref 21–32)
CREAT SERPL-MCNC: 1.33 MG/DL (ref 0.55–1.02)
EST. AVERAGE GLUCOSE BLD GHB EST-MCNC: 128 MG/DL
GLOBULIN SER CALC-MCNC: 3.7 G/DL (ref 2–4)
GLUCOSE SERPL-MCNC: 87 MG/DL (ref 65–100)
HBA1C MFR BLD: 6.1 % (ref 4–5.6)
HDLC SERPL-MCNC: 56 MG/DL
HDLC SERPL: 2.4 {RATIO} (ref 0–5)
LDLC SERPL CALC-MCNC: 55.2 MG/DL (ref 0–100)
POTASSIUM SERPL-SCNC: 4.1 MMOL/L (ref 3.5–5.1)
PROT SERPL-MCNC: 7.8 G/DL (ref 6.4–8.2)
SODIUM SERPL-SCNC: 142 MMOL/L (ref 136–145)
TRIGL SERPL-MCNC: 124 MG/DL (ref ?–150)
VLDLC SERPL CALC-MCNC: 24.8 MG/DL

## 2022-07-28 RX ORDER — BUTALBITAL, ACETAMINOPHEN AND CAFFEINE 50; 325; 40 MG/1; MG/1; MG/1
1 TABLET ORAL
Qty: 60 TABLET | Refills: 1 | Status: SHIPPED | OUTPATIENT
Start: 2022-07-28 | End: 2022-09-05

## 2022-07-28 RX ORDER — POTASSIUM CHLORIDE 20 MEQ/1
20 TABLET, EXTENDED RELEASE ORAL DAILY
Qty: 90 TABLET | Refills: 1 | Status: SHIPPED | OUTPATIENT
Start: 2022-07-28 | End: 2022-09-05

## 2022-07-28 NOTE — TELEPHONE ENCOUNTER
Patient call requesting refill    . Requested Prescriptions     Pending Prescriptions Disp Refills    potassium chloride (Klor-Con M20) 20 mEq tablet 90 Tablet 0     Sig: Take 1 Tablet by mouth in the morning. butalbital-acetaminophen-caffeine (FIORICET, ESGIC) -40 mg per tablet 60 Tablet 0     Sig: Take 1 Tablet by mouth every six (6) hours as needed for Headache or Migraine.       Best call back #[phone]

## 2022-07-28 NOTE — TELEPHONE ENCOUNTER
Last Visit: VV 7/11/22 NP Pernell, labs completed 7/25/22  Next Appointment: None  Previous Refill Encounter(s): 3/21/22 (both)  Potassium chloride 90  Fioricet 60       Requested Prescriptions     Pending Prescriptions Disp Refills    potassium chloride (Klor-Con M20) 20 mEq tablet 90 Tablet 1     Sig: Take 1 Tablet by mouth in the morning. butalbital-acetaminophen-caffeine (FIORICET, ESGIC) -40 mg per tablet 60 Tablet 1     Sig: Take 1 Tablet by mouth every six (6) hours as needed for Headache or Migraine. For [address] [facility] in place:   Recommendation Provided To:    Intervention Detail: New Rx: 2, reason: Patient Preference  Gap Closed?:   Intervention Accepted By:   Time Spent (min): 5

## 2022-09-05 RX ORDER — BUTALBITAL, ACETAMINOPHEN AND CAFFEINE 50; 325; 40 MG/1; MG/1; MG/1
TABLET ORAL
Qty: 60 TABLET | Refills: 1 | Status: SHIPPED | OUTPATIENT
Start: 2022-09-05

## 2022-09-05 RX ORDER — POTASSIUM CHLORIDE 20 MEQ/1
TABLET, EXTENDED RELEASE ORAL
Qty: 90 TABLET | Refills: 1 | Status: SHIPPED | OUTPATIENT
Start: 2022-09-05

## 2022-10-02 RX ORDER — CYCLOBENZAPRINE HCL 10 MG
TABLET ORAL
Qty: 90 TABLET | Refills: 0 | Status: SHIPPED | OUTPATIENT
Start: 2022-10-02

## 2023-01-19 ENCOUNTER — OFFICE VISIT (OUTPATIENT)
Dept: FAMILY MEDICINE CLINIC | Age: 68
End: 2023-01-19
Payer: MEDICARE

## 2023-01-19 VITALS
OXYGEN SATURATION: 95 % | HEIGHT: 63 IN | WEIGHT: 272.2 LBS | BODY MASS INDEX: 48.23 KG/M2 | SYSTOLIC BLOOD PRESSURE: 135 MMHG | TEMPERATURE: 97.7 F | HEART RATE: 85 BPM | RESPIRATION RATE: 16 BRPM | DIASTOLIC BLOOD PRESSURE: 81 MMHG

## 2023-01-19 DIAGNOSIS — G43.009 MIGRAINE WITHOUT AURA AND WITHOUT STATUS MIGRAINOSUS, NOT INTRACTABLE: ICD-10-CM

## 2023-01-19 DIAGNOSIS — M79.672 ACUTE FOOT PAIN, LEFT: ICD-10-CM

## 2023-01-19 DIAGNOSIS — R73.01 ELEVATED FASTING BLOOD SUGAR: ICD-10-CM

## 2023-01-19 DIAGNOSIS — E78.00 HYPERCHOLESTEROLEMIA: ICD-10-CM

## 2023-01-19 DIAGNOSIS — Z86.73 HISTORY OF STROKE: ICD-10-CM

## 2023-01-19 DIAGNOSIS — E66.01 MORBID OBESITY WITH BMI OF 45.0-49.9, ADULT (HCC): ICD-10-CM

## 2023-01-19 DIAGNOSIS — I10 BENIGN ESSENTIAL HTN: Primary | ICD-10-CM

## 2023-01-19 DIAGNOSIS — R60.9 DEPENDENT EDEMA: ICD-10-CM

## 2023-01-19 LAB
ALBUMIN SERPL-MCNC: 3.9 G/DL (ref 3.5–5)
ALBUMIN/GLOB SERPL: 1 (ref 1.1–2.2)
ALP SERPL-CCNC: 119 U/L (ref 45–117)
ALT SERPL-CCNC: 21 U/L (ref 12–78)
ANION GAP SERPL CALC-SCNC: 9 MMOL/L (ref 5–15)
AST SERPL-CCNC: 17 U/L (ref 15–37)
BILIRUB SERPL-MCNC: 0.8 MG/DL (ref 0.2–1)
BUN SERPL-MCNC: 11 MG/DL (ref 6–20)
BUN/CREAT SERPL: 9 (ref 12–20)
CALCIUM SERPL-MCNC: 9.6 MG/DL (ref 8.5–10.1)
CHLORIDE SERPL-SCNC: 104 MMOL/L (ref 97–108)
CHOLEST SERPL-MCNC: 162 MG/DL
CO2 SERPL-SCNC: 30 MMOL/L (ref 21–32)
CREAT SERPL-MCNC: 1.16 MG/DL (ref 0.55–1.02)
EST. AVERAGE GLUCOSE BLD GHB EST-MCNC: 126 MG/DL
GLOBULIN SER CALC-MCNC: 3.8 G/DL (ref 2–4)
GLUCOSE SERPL-MCNC: 112 MG/DL (ref 65–100)
HBA1C MFR BLD: 6 % (ref 4–5.6)
HDLC SERPL-MCNC: 56 MG/DL
HDLC SERPL: 2.9 (ref 0–5)
LDLC SERPL CALC-MCNC: 86.4 MG/DL (ref 0–100)
POTASSIUM SERPL-SCNC: 3.5 MMOL/L (ref 3.5–5.1)
PROT SERPL-MCNC: 7.7 G/DL (ref 6.4–8.2)
SODIUM SERPL-SCNC: 143 MMOL/L (ref 136–145)
TRIGL SERPL-MCNC: 98 MG/DL (ref ?–150)
VLDLC SERPL CALC-MCNC: 19.6 MG/DL

## 2023-01-19 NOTE — PROGRESS NOTES
HISTORY OF PRESENT ILLNESS  Jackie Driscoll is a 75 y.o. female. HPI  6 month follow up health problems. HTN. Taking HCTZ daily as prescribed. Hypercholesterolemia. Admits to not consistently following a healthy diet and does not exercise on a regular basis. Taking prescribed lipitor. Pre diabetes. Has reduced sugar and processed carbs in diet. Last A1C 6.1. Dependent edema. Taking lasix as prescribed with good sx control. Migraine without aura. Has headaches 5 days weekly on average. Taking fioricet with only minor symptom relief. Also takes flexeril for headache. Denies any focal neuro deficits. Would like to try another abortive med. Advised not to use imitrex due to stroke history. Has failed nortriptyline, inderal in the past.  C/o pain left heel on lateral aspect. Has had bunion surgery when she was residing in [facility] several months ago. Has been icing foot and using topicals without relief. Aggravated by weight bearing and ascending/descending stairs. Patient Active Problem List   Diagnosis Code    Benign essential HTN I10    Hypercholesterolemia E78.00    Migraine without aura and without status migrainosus, not intractable G43.009    Primary insomnia F51.01    Morbid obesity with BMI of 45.0-49.9, adult (HCC) E66.01, Z68.42    Pre-diabetes R73.03    Dependent edema R60.9    History of stroke Z86.73     Current Outpatient Medications   Medication Sig    acetaminophen/diphenhydramine (TYLENOL PM EXTRA STRENGTH PO) Take  by mouth. rimegepant (NURTEC) 75 mg disintegrating tablet Take 1 tablet at onset of migraine daily as needed.     enalapril (VASOTEC) 10 mg tablet TAKE 1 TABLET EVERY DAY    furosemide (LASIX) 20 mg tablet TAKE 1 TABLET EVERY DAY    atorvastatin (LIPITOR) 20 mg tablet TAKE 1 TABLET EVERY NIGHT    hydroCHLOROthiazide (HYDRODIURIL) 25 mg tablet TAKE 1 TABLET EVERY DAY    butalbital-acetaminophen-caffeine (FIORICET, ESGIC) -40 mg per tablet TAKE 1 TABLET EVERY 6 HOURS AS NEEDED FOR HEADACHE OR MIGRAINE    cyclobenzaprine (FLEXERIL) 10 mg tablet TAKE 1 TABLET EVERY EVENING AS NEEDED FOR MUSCLE SPASMS (DUE FOR APPOINTMENT)    potassium chloride (K-DUR, KLOR-CON M20) 20 mEq tablet TAKE 1 TABLET EVERY DAY    phosphorated carbohydrate (EMETROL) soln solution Take 15-30 mL by mouth every fifteen (15) minutes as needed for Nausea. No current facility-administered medications for this visit. Social History     Tobacco Use    Smoking status: Never    Smokeless tobacco: Never   Vaping Use    Vaping Use: Never used   Substance Use Topics    Alcohol use: Yes     Comment: occasionally    Drug use: No     Blood pressure 135/81, pulse 85, temperature 97.7 °F (36.5 °C), temperature source Temporal, resp. rate 16, height 5' 3\" (1.6 m), weight 272 lb 3.2 oz (123.5 kg), SpO2 95 %. Review of Systems   Constitutional:  Negative for malaise/fatigue. Respiratory:  Negative for cough and shortness of breath. Cardiovascular:  Negative for chest pain, palpitations and leg swelling. Musculoskeletal:         Foot pain as stated. All other systems reviewed and are negative. Physical Exam  Constitutional:       General: She is not in acute distress. Appearance: She is obese. Cardiovascular:      Rate and Rhythm: Normal rate and regular rhythm. Heart sounds: Normal heart sounds. Pulmonary:      Effort: Pulmonary effort is normal.      Breath sounds: Normal breath sounds. Musculoskeletal:      Cervical back: Neck supple. Right lower leg: No edema. Left lower leg: No edema. Lymphadenopathy:      Cervical: No cervical adenopathy. Skin:     General: Skin is warm and dry. Neurological:      Coordination: Coordination normal.      Gait: Gait abnormal (ambulating with slight limp favoring right leg). Psychiatric:         Mood and Affect: Mood normal.       ASSESSMENT and PLAN  Diagnoses and all orders for this visit:    1.  Benign essential HTN  Controlled. Continue current treatment. 2. Morbid obesity with BMI of 45.0-49.9, adult ([facility] [address]  Weight loss recommendations given. 3. Hypercholesterolemia  -     METABOLIC PANEL, COMPREHENSIVE; Future  -     LIPID PANEL; Future  Reviewed healthy diet and exercise recommendations. Fasting labs sent. 4. Migraine without aura and without status migrainosus, not intractable  -     rimegepant (NURTEC) 75 mg disintegrating tablet; Take 1 tablet at onset of migraine daily as needed. Trial nurtec as directed. 5. Elevated fasting blood sugar  -     HEMOGLOBIN A1C WITH EAG; Future  Reviewed diabetic diet and carbohydrate restriction. 6. Dependent edema  Controlled on lasix. 7. History of stroke  Condition stable. 8. Acute foot pain, left  -     REFERRAL TO PODIATRY  Contact info given to patient to schedule appointment. Follow up 6 months. We discussed the expected course, resolution and complications of the diagnosis in detail. Medication risks, benefits, costs, interactions and side effects were discussed. The patient was advised to contact the office for worsening of condition or FTI as anticipated. The patient expressed understanding of the diagnosis and plan.

## 2023-01-19 NOTE — PROGRESS NOTES
Chief Complaint   Patient presents with    Medication Refill    Foot Pain     \"Left heel of foot painful that sometimes radiates down to toes\"         1. \"Have you been to the ER, urgent care clinic since your last visit? Hospitalized since your last visit? \" No    2. \"Have you seen or consulted any other health care providers outside of the [address] [place] [address] since your last visit? \" No     3. For patients aged 39-70: Has the patient had a colonoscopy / FIT/ Cologuard? No      If the patient is female:    4. For patients aged 41-77: Has the patient had a mammogram within the past 2 years? Yes - no Care Gap present      5. For patients aged 21-65: Has the patient had a pap smear?  NA - based on age or sex         3 most recent PHQ Screens 1/19/2023   Little interest or pleasure in doing things Not at all   Feeling down, depressed, irritable, or hopeless Not at all   Total Score PHQ 2 0       Health Maintenance Due   Topic Date Due    Shingles Vaccine (1 of 2) Never done    Bone Densitometry (Dexa) Screening  Never done    Pneumococcal 65+ years (1 - PCV) Never done    COVID-19 Vaccine (2 - Booster for [place] series) 06/04/2021    Breast Cancer Screen Mammogram  05/16/2022    Flu Vaccine (1) 08/01/2022    Depression Screen  09/23/2022    Medicare Yearly Exam  09/24/2022

## 2023-02-15 RX ORDER — BUTALBITAL, ACETAMINOPHEN AND CAFFEINE 50; 325; 40 MG/1; MG/1; MG/1
TABLET ORAL
Qty: 60 TABLET | Refills: 0 | Status: SHIPPED | OUTPATIENT
Start: 2023-02-15

## 2023-04-28 RX ORDER — BUTALBITAL, ACETAMINOPHEN AND CAFFEINE 50; 325; 40 MG/1; MG/1; MG/1
TABLET ORAL
Qty: 60 TABLET | Refills: 1 | Status: SHIPPED | OUTPATIENT
Start: 2023-04-28

## 2023-05-25 RX ORDER — CYCLOBENZAPRINE HCL 10 MG
TABLET ORAL
COMMUNITY
Start: 2023-01-18 | End: 2023-07-17

## 2023-05-25 RX ORDER — HYDROCHLOROTHIAZIDE 25 MG/1
1 TABLET ORAL DAILY
COMMUNITY
Start: 2023-01-18 | End: 2023-06-27

## 2023-05-25 RX ORDER — BUTALBITAL, ACETAMINOPHEN AND CAFFEINE 50; 325; 40 MG/1; MG/1; MG/1
TABLET ORAL
COMMUNITY
Start: 2023-04-28 | End: 2023-06-16

## 2023-05-25 RX ORDER — POTASSIUM CHLORIDE 20 MEQ/1
1 TABLET, EXTENDED RELEASE ORAL DAILY
COMMUNITY
Start: 2022-09-05 | End: 2023-06-07

## 2023-05-25 RX ORDER — FUROSEMIDE 20 MG/1
1 TABLET ORAL DAILY
COMMUNITY
Start: 2023-01-18 | End: 2023-06-27

## 2023-05-25 RX ORDER — ENALAPRIL MALEATE 10 MG/1
1 TABLET ORAL DAILY
COMMUNITY
Start: 2023-01-18 | End: 2023-06-27

## 2023-05-25 RX ORDER — ATORVASTATIN CALCIUM 20 MG/1
1 TABLET, FILM COATED ORAL NIGHTLY
COMMUNITY
Start: 2023-01-18 | End: 2023-06-27

## 2023-06-07 RX ORDER — POTASSIUM CHLORIDE 20 MEQ/1
TABLET, EXTENDED RELEASE ORAL
Qty: 90 TABLET | Refills: 0 | Status: SHIPPED | OUTPATIENT
Start: 2023-06-07

## 2023-06-07 NOTE — TELEPHONE ENCOUNTER
Chief Complaint   Patient presents with    Medication Refill     Last Office visit  01/19/2023  Next follow Up none on chart

## 2023-06-16 RX ORDER — BUTALBITAL, ACETAMINOPHEN AND CAFFEINE 50; 325; 40 MG/1; MG/1; MG/1
TABLET ORAL
Qty: 60 TABLET | Refills: 0 | Status: SHIPPED | OUTPATIENT
Start: 2023-06-16 | End: 2023-07-14

## 2023-06-27 RX ORDER — FUROSEMIDE 20 MG/1
TABLET ORAL
Qty: 90 TABLET | Refills: 0 | Status: SHIPPED | OUTPATIENT
Start: 2023-06-27

## 2023-06-27 RX ORDER — HYDROCHLOROTHIAZIDE 25 MG/1
25 TABLET ORAL DAILY
Qty: 90 TABLET | Refills: 0 | Status: SHIPPED | OUTPATIENT
Start: 2023-06-27

## 2023-06-27 RX ORDER — ATORVASTATIN CALCIUM 20 MG/1
TABLET, FILM COATED ORAL
Qty: 90 TABLET | Refills: 0 | Status: SHIPPED | OUTPATIENT
Start: 2023-06-27

## 2023-06-27 RX ORDER — ENALAPRIL MALEATE 10 MG/1
10 TABLET ORAL DAILY
Qty: 90 TABLET | Refills: 0 | Status: SHIPPED | OUTPATIENT
Start: 2023-06-27

## 2023-07-14 RX ORDER — BUTALBITAL, ACETAMINOPHEN AND CAFFEINE 50; 325; 40 MG/1; MG/1; MG/1
TABLET ORAL
Qty: 60 TABLET | Refills: 0 | Status: SHIPPED | OUTPATIENT
Start: 2023-07-14

## 2023-07-17 RX ORDER — CYCLOBENZAPRINE HCL 10 MG
TABLET ORAL
Qty: 30 TABLET | Refills: 0 | Status: SHIPPED | OUTPATIENT
Start: 2023-07-17

## 2023-08-15 RX ORDER — BUTALBITAL, ACETAMINOPHEN AND CAFFEINE 50; 325; 40 MG/1; MG/1; MG/1
TABLET ORAL
Qty: 60 TABLET | Refills: 0 | Status: SHIPPED | OUTPATIENT
Start: 2023-08-15

## 2023-08-15 NOTE — TELEPHONE ENCOUNTER
PCP: BENITA Rolle - NP    Last appt: [unfilled]  No future appointments.     Requested Prescriptions     Pending Prescriptions Disp Refills    butalbital-acetaminophen-caffeine (FIORICET, ESGIC) -40 MG per tablet [Pharmacy Med Name: BUTALBITAL/ACETAMINOPHEN/CAFFEINE -40 MG Tablet] 60 tablet 0     Sig: TAKE 1 TABLET EVERY 6 HOURS AS NEEDED FOR HEADACHE OR MIGRAINE (APPOINTMENT DUE)

## 2023-08-31 RX ORDER — CYCLOBENZAPRINE HCL 10 MG
TABLET ORAL
Qty: 30 TABLET | Refills: 0 | Status: SHIPPED | OUTPATIENT
Start: 2023-08-31

## 2023-09-14 RX ORDER — BUTALBITAL, ACETAMINOPHEN AND CAFFEINE 50; 325; 40 MG/1; MG/1; MG/1
TABLET ORAL
Qty: 60 TABLET | Refills: 0 | Status: SHIPPED | OUTPATIENT
Start: 2023-09-14 | End: 2023-10-13

## 2023-10-13 RX ORDER — BUTALBITAL, ACETAMINOPHEN AND CAFFEINE 50; 325; 40 MG/1; MG/1; MG/1
TABLET ORAL
Qty: 30 TABLET | Refills: 0 | Status: SHIPPED | OUTPATIENT
Start: 2023-10-13 | End: 2023-10-29

## 2023-10-13 NOTE — TELEPHONE ENCOUNTER
PCP: BENITA Pantoja NP      Future Appointments   Date Time Provider [address]   10/16/2023  2:40 PM Sonal Sun APRN - NP [facility]       Requested Prescriptions     Pending Prescriptions Disp Refills    butalbital-acetaminophen-caffeine (FIORICET, ESGIC) -40 MG per tablet [Pharmacy Med Name: BUTALBITAL/ACETAMINOPHEN/CAFFEINE -40 MG Tablet] 60 tablet 10     Sig: TAKE 1 TABLET EVERY 6 HOURS AS NEEDED FOR HEADACHE OR MIGRAINE (APPOINTMENT DUE)

## 2023-10-16 ENCOUNTER — OFFICE VISIT (OUTPATIENT)
Age: 68
End: 2023-10-16
Payer: MEDICARE

## 2023-10-16 VITALS
TEMPERATURE: 98.1 F | RESPIRATION RATE: 18 BRPM | DIASTOLIC BLOOD PRESSURE: 70 MMHG | WEIGHT: 263.4 LBS | OXYGEN SATURATION: 97 % | SYSTOLIC BLOOD PRESSURE: 102 MMHG | BODY MASS INDEX: 46.67 KG/M2 | HEIGHT: 63 IN | HEART RATE: 76 BPM

## 2023-10-16 DIAGNOSIS — R60.9 DEPENDENT EDEMA: ICD-10-CM

## 2023-10-16 DIAGNOSIS — Z12.31 ENCOUNTER FOR SCREENING MAMMOGRAM FOR MALIGNANT NEOPLASM OF BREAST: ICD-10-CM

## 2023-10-16 DIAGNOSIS — E78.00 HYPERCHOLESTEROLEMIA: ICD-10-CM

## 2023-10-16 DIAGNOSIS — G43.109 MIGRAINE WITH AURA AND WITHOUT STATUS MIGRAINOSUS, NOT INTRACTABLE: ICD-10-CM

## 2023-10-16 DIAGNOSIS — Z86.73 HISTORY OF STROKE: ICD-10-CM

## 2023-10-16 DIAGNOSIS — Z12.11 SCREENING FOR COLON CANCER: ICD-10-CM

## 2023-10-16 DIAGNOSIS — I10 BENIGN ESSENTIAL HTN: Primary | ICD-10-CM

## 2023-10-16 DIAGNOSIS — R73.01 ELEVATED FASTING BLOOD SUGAR: ICD-10-CM

## 2023-10-16 DIAGNOSIS — E66.01 MORBID OBESITY WITH BMI OF 45.0-49.9, ADULT (HCC): ICD-10-CM

## 2023-10-16 PROCEDURE — 1090F PRES/ABSN URINE INCON ASSESS: CPT | Performed by: NURSE PRACTITIONER

## 2023-10-16 PROCEDURE — 99214 OFFICE O/P EST MOD 30 MIN: CPT | Performed by: NURSE PRACTITIONER

## 2023-10-16 PROCEDURE — 3078F DIAST BP <80 MM HG: CPT | Performed by: NURSE PRACTITIONER

## 2023-10-16 PROCEDURE — G8419 CALC BMI OUT NRM PARAM NOF/U: HCPCS | Performed by: NURSE PRACTITIONER

## 2023-10-16 PROCEDURE — G8484 FLU IMMUNIZE NO ADMIN: HCPCS | Performed by: NURSE PRACTITIONER

## 2023-10-16 PROCEDURE — 3017F COLORECTAL CA SCREEN DOC REV: CPT | Performed by: NURSE PRACTITIONER

## 2023-10-16 PROCEDURE — 1036F TOBACCO NON-USER: CPT | Performed by: NURSE PRACTITIONER

## 2023-10-16 PROCEDURE — 3074F SYST BP LT 130 MM HG: CPT | Performed by: NURSE PRACTITIONER

## 2023-10-16 PROCEDURE — G8427 DOCREV CUR MEDS BY ELIG CLIN: HCPCS | Performed by: NURSE PRACTITIONER

## 2023-10-16 PROCEDURE — G8400 PT W/DXA NO RESULTS DOC: HCPCS | Performed by: NURSE PRACTITIONER

## 2023-10-16 PROCEDURE — 1123F ACP DISCUSS/DSCN MKR DOCD: CPT | Performed by: NURSE PRACTITIONER

## 2023-10-16 RX ORDER — ONDANSETRON 4 MG/1
TABLET, ORALLY DISINTEGRATING ORAL
COMMUNITY
Start: 2018-03-15

## 2023-10-16 SDOH — ECONOMIC STABILITY: INCOME INSECURITY: HOW HARD IS IT FOR YOU TO PAY FOR THE VERY BASICS LIKE FOOD, HOUSING, MEDICAL CARE, AND HEATING?: NOT HARD AT ALL

## 2023-10-16 SDOH — ECONOMIC STABILITY: HOUSING INSECURITY
IN THE LAST 12 MONTHS, WAS THERE A TIME WHEN YOU DID NOT HAVE A STEADY PLACE TO SLEEP OR SLEPT IN A SHELTER (INCLUDING NOW)?: NO

## 2023-10-16 SDOH — ECONOMIC STABILITY: FOOD INSECURITY: WITHIN THE PAST 12 MONTHS, YOU WORRIED THAT YOUR FOOD WOULD RUN OUT BEFORE YOU GOT MONEY TO BUY MORE.: NEVER TRUE

## 2023-10-16 SDOH — ECONOMIC STABILITY: FOOD INSECURITY: WITHIN THE PAST 12 MONTHS, THE FOOD YOU BOUGHT JUST DIDN'T LAST AND YOU DIDN'T HAVE MONEY TO GET MORE.: NEVER TRUE

## 2023-10-16 ASSESSMENT — PATIENT HEALTH QUESTIONNAIRE - PHQ9
SUM OF ALL RESPONSES TO PHQ QUESTIONS 1-9: 0
1. LITTLE INTEREST OR PLEASURE IN DOING THINGS: 0
SUM OF ALL RESPONSES TO PHQ QUESTIONS 1-9: 0
2. FEELING DOWN, DEPRESSED OR HOPELESS: 0
SUM OF ALL RESPONSES TO PHQ9 QUESTIONS 1 & 2: 0

## 2023-10-16 ASSESSMENT — ENCOUNTER SYMPTOMS
SHORTNESS OF BREATH: 0
CHEST TIGHTNESS: 0

## 2023-10-16 NOTE — PROGRESS NOTES
Subjective:      Patient ID: Meghna Torre is a 70 y.o. female. HPI    Overdue follow up health problems. HTN. Taking prescribed meds. Hypercholesterolemia. Admits to not consistently following a healthy diet and does not exercise on a regular basis. Taking prescribed lipitor. Pre diabetes. Has reduced sugar and processed carbs in diet. Last A1C 6.0. Dependent edema. Taking lasix as prescribed with good sx control. Migraine with aura. Has headaches about 2-3 x month. Takes fioricet and sometimes flexeril for more severe symptoms. Tries to avoid triggers AMAP. History of CVA 45. Due for next mammogram.  Due for screening colonoscopy. Did not have good experience with last colonoscopy. Would like to do cologuard. Patient Active Problem List   Diagnosis    Pre-diabetes    Benign essential HTN    Hypercholesterolemia    Morbid obesity with BMI of 45.0-49.9, adult ([address])    Primary insomnia    Dependent edema    History of stroke    Migraine with aura and without status migrainosus, not intractable     Current Outpatient Medications   Medication Sig    ondansetron (ZOFRAN-ODT) 4 MG disintegrating tablet     Sodium Citrate Dihydrate (EMETROL PO) Take 15-30 mLs by mouth    butalbital-acetaminophen-caffeine (FIORICET, ESGIC) -40 MG per tablet TAKE 1 TABLET EVERY 6 HOURS AS NEEDED FOR HEADACHE OR MIGRAINE (APPOINTMENT DUE)    cyclobenzaprine (FLEXERIL) 10 MG tablet TAKE 1 TABLET EVERY EVENING AS NEEDED FOR MUSCLE SPASMS (DUE FOR APPOINTMENT)    enalapril (VASOTEC) 10 MG tablet Take 1 tablet by mouth daily Appointment due. furosemide (LASIX) 20 MG tablet TAKE 1 TABLET EVERY DAY    atorvastatin (LIPITOR) 20 MG tablet TAKE 1 TABLET EVERY NIGHT    hydroCHLOROthiazide (HYDRODIURIL) 25 MG tablet Take 1 tablet by mouth daily Appointment due.     potassium chloride (KLOR-CON M) 20 MEQ extended release tablet TAKE 1 TABLET EVERY DAY     No current facility-administered medications for this

## 2023-10-17 LAB
ALBUMIN SERPL-MCNC: 3.8 G/DL (ref 3.5–5)
ALBUMIN/GLOB SERPL: 1.1 (ref 1.1–2.2)
ALP SERPL-CCNC: 106 U/L (ref 45–117)
ALT SERPL-CCNC: 24 U/L (ref 12–78)
ANION GAP SERPL CALC-SCNC: 7 MMOL/L (ref 5–15)
AST SERPL-CCNC: 17 U/L (ref 15–37)
BILIRUB SERPL-MCNC: 0.4 MG/DL (ref 0.2–1)
BUN SERPL-MCNC: 16 MG/DL (ref 6–20)
BUN/CREAT SERPL: 13 (ref 12–20)
CALCIUM SERPL-MCNC: 8.9 MG/DL (ref 8.5–10.1)
CHLORIDE SERPL-SCNC: 106 MMOL/L (ref 97–108)
CHOLEST SERPL-MCNC: 183 MG/DL
CO2 SERPL-SCNC: 27 MMOL/L (ref 21–32)
CREAT SERPL-MCNC: 1.19 MG/DL (ref 0.55–1.02)
EST. AVERAGE GLUCOSE BLD GHB EST-MCNC: 128 MG/DL
GLOBULIN SER CALC-MCNC: 3.4 G/DL (ref 2–4)
GLUCOSE SERPL-MCNC: 108 MG/DL (ref 65–100)
HBA1C MFR BLD: 6.1 % (ref 4–5.6)
HDLC SERPL-MCNC: 54 MG/DL
HDLC SERPL: 3.4 (ref 0–5)
LDLC SERPL CALC-MCNC: 106 MG/DL (ref 0–100)
POTASSIUM SERPL-SCNC: 3.5 MMOL/L (ref 3.5–5.1)
PROT SERPL-MCNC: 7.2 G/DL (ref 6.4–8.2)
SODIUM SERPL-SCNC: 140 MMOL/L (ref 136–145)
TRIGL SERPL-MCNC: 115 MG/DL
VLDLC SERPL CALC-MCNC: 23 MG/DL

## 2023-10-29 RX ORDER — BUTALBITAL, ACETAMINOPHEN AND CAFFEINE 50; 325; 40 MG/1; MG/1; MG/1
1 TABLET ORAL EVERY 6 HOURS PRN
Qty: 30 TABLET | Refills: 3 | Status: SHIPPED | OUTPATIENT
Start: 2023-10-29

## 2023-11-02 LAB — NONINV COLON CA DNA+OCC BLD SCRN STL QL: NEGATIVE

## 2023-11-20 RX ORDER — ENALAPRIL MALEATE 10 MG/1
10 TABLET ORAL DAILY
Qty: 90 TABLET | Refills: 1 | Status: SHIPPED | OUTPATIENT
Start: 2023-11-20

## 2023-11-20 RX ORDER — ATORVASTATIN CALCIUM 20 MG/1
TABLET, FILM COATED ORAL
Qty: 90 TABLET | Refills: 1 | Status: SHIPPED | OUTPATIENT
Start: 2023-11-20

## 2023-11-20 RX ORDER — FUROSEMIDE 20 MG/1
TABLET ORAL
Qty: 90 TABLET | Refills: 1 | Status: SHIPPED | OUTPATIENT
Start: 2023-11-20

## 2023-11-20 RX ORDER — HYDROCHLOROTHIAZIDE 25 MG/1
25 TABLET ORAL DAILY
Qty: 90 TABLET | Refills: 1 | Status: SHIPPED | OUTPATIENT
Start: 2023-11-20

## 2023-11-20 NOTE — TELEPHONE ENCOUNTER
PCP: Coco Sun APRN - NP      No future appointments.    Last seen: 10/16/2023    Requested Prescriptions     Pending Prescriptions Disp Refills    furosemide (LASIX) 20 MG tablet [Pharmacy Med Name: FUROSEMIDE 20 MG Tablet] 90 tablet 10     Sig: TAKE 1 TABLET EVERY DAY    enalapril (VASOTEC) 10 MG tablet [Pharmacy Med Name: ENALAPRIL MALEATE 10 MG Tablet] 90 tablet 10     Sig: TAKE 1 TABLET EVERY DAY (APPOINTMENT DUE)    atorvastatin (LIPITOR) 20 MG tablet [Pharmacy Med Name: ATORVASTATIN CALCIUM 20 MG Tablet] 90 tablet 10     Sig: TAKE 1 TABLET EVERY NIGHT    hydroCHLOROthiazide (HYDRODIURIL) 25 MG tablet [Pharmacy Med Name: HYDROCHLOROTHIAZIDE 25 MG Tablet] 90 tablet 10     Sig: TAKE 1 TABLET EVERY DAY (APPOINTMENT DUE)

## 2023-12-26 NOTE — TELEPHONE ENCOUNTER
PCP: BENITA Bunch NP      No future appointments.     Requested Prescriptions     Pending Prescriptions Disp Refills    cyclobenzaprine (FLEXERIL) 10 MG tablet [Pharmacy Med Name: CYCLOBENZAPRINE HYDROCHLORIDE 10 MG Tablet] 30 tablet 3     Sig: TAKE 1 TABLET EVERY NIGHT AS NEEDED FOR MUSCLE SPASMS      Last seen at 10/16/2023

## 2023-12-27 RX ORDER — CYCLOBENZAPRINE HCL 10 MG
TABLET ORAL
Qty: 30 TABLET | Refills: 3 | Status: SHIPPED | OUTPATIENT
Start: 2023-12-27

## 2023-12-27 RX ORDER — POTASSIUM CHLORIDE 20 MEQ/1
TABLET, EXTENDED RELEASE ORAL
Qty: 90 TABLET | Refills: 1 | Status: SHIPPED | OUTPATIENT
Start: 2023-12-27

## 2024-03-27 RX ORDER — CYCLOBENZAPRINE HCL 10 MG
TABLET ORAL
Qty: 30 TABLET | Refills: 0 | Status: SHIPPED | OUTPATIENT
Start: 2024-03-27

## 2024-03-27 NOTE — TELEPHONE ENCOUNTER
PCP: Coco Sun APRN - NP      No future appointments.    Requested Prescriptions     Pending Prescriptions Disp Refills    cyclobenzaprine (FLEXERIL) 10 MG tablet [Pharmacy Med Name: CYCLOBENZAPRINE HYDROCHLORIDE 10 MG Tablet] 30 tablet 11     Sig: TAKE 1 TABLET EVERY NIGHT AS NEEDED FOR MUSCLE SPASMS       Prior labs and Blood pressures:  BP Readings from Last 3 Encounters:   10/16/23 102/70   01/19/23 135/81   09/23/21 136/82     Lab Results   Component Value Date/Time     10/16/2023 02:48 PM    K 3.5 10/16/2023 02:48 PM     10/16/2023 02:48 PM    CO2 27 10/16/2023 02:48 PM    BUN 16 10/16/2023 02:48 PM    GFRAA 48 07/25/2022 11:56 AM     No results found for: \"HBA1C\", \"[id number]\"  Lab Results   Component Value Date/Time    CHOL 183 10/16/2023 02:48 PM    HDL 54 10/16/2023 02:48 PM     No results found for: \"VITD3\", \"VD3RIA\"    No results found for: \"TSH\", \"TSH2\", \"TSH3\"     LOV 10/16/2023

## 2024-04-15 RX ORDER — ENALAPRIL MALEATE 10 MG/1
10 TABLET ORAL DAILY
Qty: 90 TABLET | Refills: 0 | Status: SHIPPED | OUTPATIENT
Start: 2024-04-15

## 2024-04-15 RX ORDER — HYDROCHLOROTHIAZIDE 25 MG/1
25 TABLET ORAL DAILY
Qty: 90 TABLET | Refills: 0 | Status: SHIPPED | OUTPATIENT
Start: 2024-04-15

## 2024-04-15 RX ORDER — FUROSEMIDE 20 MG/1
TABLET ORAL
Qty: 90 TABLET | Refills: 0 | Status: SHIPPED | OUTPATIENT
Start: 2024-04-15

## 2024-04-15 RX ORDER — ATORVASTATIN CALCIUM 20 MG/1
20 TABLET, FILM COATED ORAL NIGHTLY
Qty: 90 TABLET | Refills: 0 | Status: SHIPPED | OUTPATIENT
Start: 2024-04-15

## 2024-04-22 NOTE — TELEPHONE ENCOUNTER
PCP: Coco Sun APRN - NP      No future appointments.    Requested Prescriptions     Pending Prescriptions Disp Refills    cyclobenzaprine (FLEXERIL) 10 MG tablet [Pharmacy Med Name: CYCLOBENZAPRINE HYDROCHLORIDE 10 MG Tablet] 30 tablet 0     Sig: TAKE 1 TABLET EVERY NIGHT AS NEEDED FOR MUSCLE SPASMS       Prior labs and Blood pressures:  BP Readings from Last 3 Encounters:   10/16/23 102/70   01/19/23 135/81   09/23/21 136/82     Lab Results   Component Value Date/Time     10/16/2023 02:48 PM    K 3.5 10/16/2023 02:48 PM     10/16/2023 02:48 PM    CO2 27 10/16/2023 02:48 PM    BUN 16 10/16/2023 02:48 PM    GFRAA 48 07/25/2022 11:56 AM     No results found for: \"HBA1C\", \"[id number]\"  Lab Results   Component Value Date/Time    CHOL 183 10/16/2023 02:48 PM    HDL 54 10/16/2023 02:48 PM     No results found for: \"VITD3\", \"VD3RIA\"    No results found for: \"TSH\", \"TSH2\", \"TSH3\"   Last seen at 10/16/2023

## 2024-04-23 RX ORDER — CYCLOBENZAPRINE HCL 10 MG
TABLET ORAL
Qty: 30 TABLET | Refills: 0 | OUTPATIENT
Start: 2024-04-23

## 2024-04-23 NOTE — TELEPHONE ENCOUNTER
Informed pt that her Flexeril was refused until she make's an appt with REYNA Sun.\"Pt stated she would callback to schedule.\"

## 2024-05-28 RX ORDER — POTASSIUM CHLORIDE 20 MEQ/1
TABLET, EXTENDED RELEASE ORAL
Qty: 90 TABLET | Refills: 0 | Status: SHIPPED | OUTPATIENT
Start: 2024-05-28

## 2024-06-27 ENCOUNTER — OFFICE VISIT (OUTPATIENT)
Age: 69
End: 2024-06-27
Payer: MEDICARE

## 2024-06-27 VITALS
SYSTOLIC BLOOD PRESSURE: 125 MMHG | OXYGEN SATURATION: 98 % | WEIGHT: 257.6 LBS | RESPIRATION RATE: 16 BRPM | HEART RATE: 85 BPM | HEIGHT: 63 IN | TEMPERATURE: 97.6 F | BODY MASS INDEX: 45.64 KG/M2 | DIASTOLIC BLOOD PRESSURE: 81 MMHG

## 2024-06-27 DIAGNOSIS — E78.00 HYPERCHOLESTEROLEMIA: ICD-10-CM

## 2024-06-27 DIAGNOSIS — R42 DISEQUILIBRIUM: ICD-10-CM

## 2024-06-27 DIAGNOSIS — F51.01 PRIMARY INSOMNIA: ICD-10-CM

## 2024-06-27 DIAGNOSIS — I10 BENIGN ESSENTIAL HTN: Primary | ICD-10-CM

## 2024-06-27 DIAGNOSIS — Z86.73 HISTORY OF STROKE: ICD-10-CM

## 2024-06-27 DIAGNOSIS — E66.01 MORBID OBESITY WITH BMI OF 45.0-49.9, ADULT (HCC): ICD-10-CM

## 2024-06-27 DIAGNOSIS — G43.109 MIGRAINE WITH AURA AND WITHOUT STATUS MIGRAINOSUS, NOT INTRACTABLE: ICD-10-CM

## 2024-06-27 DIAGNOSIS — R73.01 ELEVATED FASTING BLOOD SUGAR: ICD-10-CM

## 2024-06-27 DIAGNOSIS — R60.9 DEPENDENT EDEMA: ICD-10-CM

## 2024-06-27 LAB
ALBUMIN SERPL-MCNC: 4 G/DL (ref 3.5–5)
ALBUMIN/GLOB SERPL: 1 (ref 1.1–2.2)
ALP SERPL-CCNC: 119 U/L (ref 45–117)
ALT SERPL-CCNC: 21 U/L (ref 12–78)
ANION GAP SERPL CALC-SCNC: 4 MMOL/L (ref 5–15)
AST SERPL-CCNC: 20 U/L (ref 15–37)
BILIRUB SERPL-MCNC: 0.5 MG/DL (ref 0.2–1)
BUN SERPL-MCNC: 11 MG/DL (ref 6–20)
BUN/CREAT SERPL: 10 (ref 12–20)
CALCIUM SERPL-MCNC: 9.8 MG/DL (ref 8.5–10.1)
CHLORIDE SERPL-SCNC: 104 MMOL/L (ref 97–108)
CHOLEST SERPL-MCNC: 164 MG/DL
CO2 SERPL-SCNC: 28 MMOL/L (ref 21–32)
COMMENT:: NORMAL
CREAT SERPL-MCNC: 1.08 MG/DL (ref 0.55–1.02)
EST. AVERAGE GLUCOSE BLD GHB EST-MCNC: 128 MG/DL
GLOBULIN SER CALC-MCNC: 4 G/DL (ref 2–4)
GLUCOSE SERPL-MCNC: 105 MG/DL (ref 65–100)
HBA1C MFR BLD: 6.1 % (ref 4–5.6)
HDLC SERPL-MCNC: 56 MG/DL
HDLC SERPL: 2.9 (ref 0–5)
LDLC SERPL CALC-MCNC: 75.2 MG/DL (ref 0–100)
POTASSIUM SERPL-SCNC: 4.1 MMOL/L (ref 3.5–5.1)
PROT SERPL-MCNC: 8 G/DL (ref 6.4–8.2)
SODIUM SERPL-SCNC: 136 MMOL/L (ref 136–145)
SPECIMEN HOLD: NORMAL
TRIGL SERPL-MCNC: 164 MG/DL
VLDLC SERPL CALC-MCNC: 32.8 MG/DL

## 2024-06-27 PROCEDURE — 3079F DIAST BP 80-89 MM HG: CPT | Performed by: NURSE PRACTITIONER

## 2024-06-27 PROCEDURE — 1036F TOBACCO NON-USER: CPT | Performed by: NURSE PRACTITIONER

## 2024-06-27 PROCEDURE — 3017F COLORECTAL CA SCREEN DOC REV: CPT | Performed by: NURSE PRACTITIONER

## 2024-06-27 PROCEDURE — G8400 PT W/DXA NO RESULTS DOC: HCPCS | Performed by: NURSE PRACTITIONER

## 2024-06-27 PROCEDURE — 99214 OFFICE O/P EST MOD 30 MIN: CPT | Performed by: NURSE PRACTITIONER

## 2024-06-27 PROCEDURE — 3074F SYST BP LT 130 MM HG: CPT | Performed by: NURSE PRACTITIONER

## 2024-06-27 PROCEDURE — G8417 CALC BMI ABV UP PARAM F/U: HCPCS | Performed by: NURSE PRACTITIONER

## 2024-06-27 PROCEDURE — 1090F PRES/ABSN URINE INCON ASSESS: CPT | Performed by: NURSE PRACTITIONER

## 2024-06-27 PROCEDURE — G8427 DOCREV CUR MEDS BY ELIG CLIN: HCPCS | Performed by: NURSE PRACTITIONER

## 2024-06-27 PROCEDURE — 1123F ACP DISCUSS/DSCN MKR DOCD: CPT | Performed by: NURSE PRACTITIONER

## 2024-06-27 RX ORDER — DOXEPIN HYDROCHLORIDE 10 MG/1
10 CAPSULE ORAL NIGHTLY
Qty: 30 CAPSULE | Refills: 1 | Status: SHIPPED | OUTPATIENT
Start: 2024-06-27

## 2024-06-27 ASSESSMENT — ENCOUNTER SYMPTOMS
CHEST TIGHTNESS: 0
SHORTNESS OF BREATH: 0

## 2024-06-27 ASSESSMENT — PATIENT HEALTH QUESTIONNAIRE - PHQ9
SUM OF ALL RESPONSES TO PHQ QUESTIONS 1-9: 3
9. THOUGHTS THAT YOU WOULD BE BETTER OFF DEAD, OR OF HURTING YOURSELF: NOT AT ALL
SUM OF ALL RESPONSES TO PHQ QUESTIONS 1-9: 3
10. IF YOU CHECKED OFF ANY PROBLEMS, HOW DIFFICULT HAVE THESE PROBLEMS MADE IT FOR YOU TO DO YOUR WORK, TAKE CARE OF THINGS AT HOME, OR GET ALONG WITH OTHER PEOPLE: NOT DIFFICULT AT ALL
4. FEELING TIRED OR HAVING LITTLE ENERGY: NOT AT ALL
SUM OF ALL RESPONSES TO PHQ9 QUESTIONS 1 & 2: 0
8. MOVING OR SPEAKING SO SLOWLY THAT OTHER PEOPLE COULD HAVE NOTICED. OR THE OPPOSITE, BEING SO FIGETY OR RESTLESS THAT YOU HAVE BEEN MOVING AROUND A LOT MORE THAN USUAL: NOT AT ALL
SUM OF ALL RESPONSES TO PHQ QUESTIONS 1-9: 3
5. POOR APPETITE OR OVEREATING: NOT AT ALL
7. TROUBLE CONCENTRATING ON THINGS, SUCH AS READING THE NEWSPAPER OR WATCHING TELEVISION: NOT AT ALL
SUM OF ALL RESPONSES TO PHQ QUESTIONS 1-9: 3
2. FEELING DOWN, DEPRESSED OR HOPELESS: NOT AT ALL
6. FEELING BAD ABOUT YOURSELF - OR THAT YOU ARE A FAILURE OR HAVE LET YOURSELF OR YOUR FAMILY DOWN: NOT AT ALL
3. TROUBLE FALLING OR STAYING ASLEEP: NEARLY EVERY DAY

## 2024-06-27 NOTE — PROGRESS NOTES
Chief Complaint   Patient presents with    Follow-up     \"Have you been to the ER, urgent care clinic since your last visit?  Hospitalized since your last visit?\"    NO    “Have you seen or consulted any other health care providers outside of [facility] since your last visit?”    NO       Have you had a mammogram?”   NO    Date of last Mammogram: 3/1/2021          
          Assessment / Plan:      1. Benign essential HTN  -     Comprehensive Metabolic Panel; Future  Well controlled.  Continue current treatment.   2. Elevated fasting blood sugar  -     Hemoglobin A1C; Future  Reviewed diabetic diet and carbohydrate restriction.  3. Hypercholesterolemia  -     Comprehensive Metabolic Panel; Future  -     Lipid Panel; Future  At goal with last labs.  Recheck fasting labs.   4. History of stroke  -     Finesse Isbell MD, Neurology, [place] ([address])  5. Primary insomnia  -     doxepin (SINEQUAN) 10 MG capsule; Take 1 capsule by mouth nightly, Disp-30 capsule, R-1Normal  Sleep hygiene reviewed.  Trial doxepin as directed.    6. Dependent edema   Keep legs elevated at rest.    Limit salt intake.    Compression stockings.     Continue diuretic.   7. Disequilibrium  -     Finesse Isbell MD, Neurology, [place] ([address])  Will refer to neurology for further evaluation.    8. Migraine with aura and without status migrainosus, not intractable  -     Finesse Isbell MD, Neurology, [place] ([address])  Continue prn fioricet.    9. Morbid obesity with BMI of 45.0-49.9, adult ([facility])   Reviewed healthy diet and activity recommendations.       Follow up 4 months with AWV.

## 2024-06-28 RX ORDER — HYDROCHLOROTHIAZIDE 25 MG/1
25 TABLET ORAL DAILY
Qty: 90 TABLET | Refills: 1 | Status: SHIPPED | OUTPATIENT
Start: 2024-06-28

## 2024-06-28 RX ORDER — ENALAPRIL MALEATE 10 MG/1
10 TABLET ORAL DAILY
Qty: 90 TABLET | Refills: 1 | Status: SHIPPED | OUTPATIENT
Start: 2024-06-28

## 2024-06-28 RX ORDER — ATORVASTATIN CALCIUM 20 MG/1
20 TABLET, FILM COATED ORAL DAILY
Qty: 90 TABLET | Refills: 1 | Status: SHIPPED | OUTPATIENT
Start: 2024-06-28

## 2024-06-28 RX ORDER — FUROSEMIDE 20 MG/1
TABLET ORAL
Qty: 90 TABLET | Refills: 1 | Status: SHIPPED | OUTPATIENT
Start: 2024-06-28

## 2024-07-09 ENCOUNTER — TELEPHONE (OUTPATIENT)
Age: 69
End: 2024-07-09

## 2024-07-16 RX ORDER — CYCLOBENZAPRINE HCL 10 MG
10 TABLET ORAL NIGHTLY PRN
Qty: 30 TABLET | Refills: 1 | Status: SHIPPED | OUTPATIENT
Start: 2024-07-16

## 2024-07-16 NOTE — TELEPHONE ENCOUNTER
Last appointment: 6/27/24 [place]  Next appointment: None  Previous refill encounter(s): 3/27/24 30    Requested Prescriptions     Pending Prescriptions Disp Refills    cyclobenzaprine (FLEXERIL) 10 MG tablet 30 tablet 1     Sig: Take 1 tablet by mouth nightly as needed for Muscle spasms     For Pharmacy Admin Tracking Only    Program: Medication Refill  CPA in place:    Recommendation Provided To:   Intervention Detail: New Rx: 1, reason: Patient Preference  Intervention Accepted By:   Gap Closed?:    Time Spent (min): 5

## 2024-08-01 DIAGNOSIS — F51.01 PRIMARY INSOMNIA: ICD-10-CM

## 2024-08-01 RX ORDER — DOXEPIN HYDROCHLORIDE 10 MG/1
10 CAPSULE ORAL NIGHTLY
Qty: 90 CAPSULE | Refills: 1 | Status: SHIPPED | OUTPATIENT
Start: 2024-08-01

## 2024-08-01 NOTE — TELEPHONE ENCOUNTER
PCP: Coco Sun APRN - NP    Last appt: 6/27/2024   No future appointments.    Requested Prescriptions     Pending Prescriptions Disp Refills    doxepin (SINEQUAN) 10 MG capsule [Pharmacy Med Name: DOXEPIN 10 MG CAPSULE] 90 capsule 1     Sig: TAKE 1 CAPSULE BY MOUTH EVERY DAY AT NIGHT      
Calm

## 2024-08-09 RX ORDER — POTASSIUM CHLORIDE 20 MEQ/1
TABLET, EXTENDED RELEASE ORAL
Qty: 90 TABLET | Refills: 1 | Status: SHIPPED | OUTPATIENT
Start: 2024-08-09

## 2024-08-09 NOTE — TELEPHONE ENCOUNTER
PCP: Coco Sun APRN - NP      No future appointments.    Requested Prescriptions     Pending Prescriptions Disp Refills    potassium chloride (KLOR-CON M) 20 MEQ extended release tablet [Pharmacy Med Name: POTASSIUM CHLORIDE ER 20 MEQ Tablet Extended Release] 90 tablet 3     Sig: TAKE 1 TABLET EVERY DAY       Prior labs and Blood pressures:  BP Readings from Last 3 Encounters:   06/27/24 125/81   10/16/23 102/70   01/19/23 135/81     Lab Results   Component Value Date/Time     06/27/2024 09:34 AM    K 4.1 06/27/2024 09:34 AM     06/27/2024 09:34 AM    CO2 28 06/27/2024 09:34 AM    BUN 11 06/27/2024 09:34 AM    GFRAA 48 07/25/2022 11:56 AM     No results found for: \"HBA1C\", \"[id number]\"  Lab Results   Component Value Date/Time    CHOL 164 06/27/2024 09:34 AM    HDL 56 06/27/2024 09:34 AM    LDL 75.2 06/27/2024 09:34 AM     10/16/2023 02:48 PM    VLDL 32.8 06/27/2024 09:34 AM     No results found for: \"VITD3\"    No results found for: \"TSH\", \"TSH2\", \"TSH3\"   LOV 06/27/2024

## 2024-09-03 RX ORDER — CYCLOBENZAPRINE HCL 10 MG
10 TABLET ORAL NIGHTLY PRN
Qty: 30 TABLET | Refills: 2 | Status: SHIPPED | OUTPATIENT
Start: 2024-09-03

## 2024-11-10 RX ORDER — CYCLOBENZAPRINE HCL 10 MG
10 TABLET ORAL NIGHTLY PRN
Qty: 30 TABLET | Refills: 0 | Status: SHIPPED | OUTPATIENT
Start: 2024-11-10

## 2024-11-21 RX ORDER — ATORVASTATIN CALCIUM 20 MG/1
20 TABLET, FILM COATED ORAL DAILY
Qty: 90 TABLET | Refills: 3 | OUTPATIENT
Start: 2024-11-21

## 2024-11-21 RX ORDER — ENALAPRIL MALEATE 10 MG/1
10 TABLET ORAL DAILY
Qty: 90 TABLET | Refills: 3 | OUTPATIENT
Start: 2024-11-21

## 2024-11-21 RX ORDER — FUROSEMIDE 20 MG/1
TABLET ORAL
Qty: 90 TABLET | Refills: 3 | OUTPATIENT
Start: 2024-11-21

## 2024-11-21 RX ORDER — HYDROCHLOROTHIAZIDE 25 MG/1
25 TABLET ORAL DAILY
Qty: 90 TABLET | Refills: 3 | OUTPATIENT
Start: 2024-11-21

## 2024-12-05 NOTE — TELEPHONE ENCOUNTER
Last appointment: 6/27/24 [place]  Next appointment: None  Previous refill encounter(s): 11/10/24 30    Requested Prescriptions     Pending Prescriptions Disp Refills    cyclobenzaprine (FLEXERIL) 10 MG tablet [Pharmacy Med Name: Cyclobenzaprine HCl Oral Tablet 10 MG] 30 tablet 2     Sig: Take 1 tablet by mouth nightly as needed for Muscle spasms     For Pharmacy Admin Tracking Only    Program: Medication Refill  CPA in place:    Recommendation Provided To:   Intervention Detail: New Rx: 1, reason: Patient Preference  Intervention Accepted By:   Gap Closed?:    Time Spent (min): 5

## 2024-12-06 RX ORDER — CYCLOBENZAPRINE HCL 10 MG
10 TABLET ORAL NIGHTLY PRN
Qty: 30 TABLET | Refills: 0 | Status: SHIPPED | OUTPATIENT
Start: 2024-12-06

## 2024-12-11 RX ORDER — BUTALBITAL, ACETAMINOPHEN AND CAFFEINE 50; 325; 40 MG/1; MG/1; MG/1
TABLET ORAL
Qty: 30 TABLET | Refills: 0 | Status: SHIPPED | OUTPATIENT
Start: 2024-12-11

## 2024-12-11 NOTE — TELEPHONE ENCOUNTER
PCP: Coco Sun APRN - NP    Last appt: 6/27/2024   Future Appointments   Date Time Provider Department Center   1/7/2025  9:00 AM Joann Grimaldo APRN - NP [facility]       Requested Prescriptions     Pending Prescriptions Disp Refills    butalbital-acetaminophen-caffeine (FIORICET, ESGIC) -40 MG per tablet [Pharmacy Med Name: Butalbital-APAP-Caffeine Oral Tablet -40 MG] 30 tablet 3     Sig: TAKE 1 TABLET EVERY 6 HOURS AS NEEDED FOR HEADACHES

## 2024-12-29 RX ORDER — CYCLOBENZAPRINE HCL 10 MG
TABLET ORAL
Qty: 30 TABLET | Refills: 0 | Status: SHIPPED | OUTPATIENT
Start: 2024-12-29

## 2025-01-02 RX ORDER — POTASSIUM CHLORIDE 1500 MG/1
TABLET, EXTENDED RELEASE ORAL
Qty: 30 TABLET | Refills: 0 | Status: SHIPPED | OUTPATIENT
Start: 2025-01-02

## 2025-01-06 NOTE — PROGRESS NOTES
NEUROLOGY  NEW PATIENT EVALUATION/CONSULTATION         PATIENT ID:   Nery Murguia  1955  69 y.o. female  [id number]     REASON FOR CONSULTATION: Migraines, incoordination        Previous records (physician notes, laboratory reports, and radiology reports) and imaging studies were reviewed and summarized. My recommendations will be communicated back to the patient's physician(s) via electronic medical record and/or by US mail.     Chief Complaint   Patient presents with    New Patient     Migraines  Imbalance/vertigo       Referred by: PCP      HISTORY OF PRESENT ILLNESS:   Patient presents for evaluation of longstanding history of migraines since age 11. She has seen her PCP for migraines recently and was prescribed Fioricet, which provides a mild benefit to migraine intensity, but does not alleviate migraine. She uses 2 Fioricet tablets about 4 days per week, and typically takes 1,000mg of APAP with it. In the past year, she has also been experiencing imbalance, with near falls. She has a PMHx of stroke in 1993 with residual left sided weakness (arm/leg).     Location: left hemispheric  Frequency:12 days per month  # HA free days per month: ~18  Duration: 12-24 hrs  Aura: No  Associated Sx with HA: photophonophobia, nausea and vomiting  Sleep habits: Insomnia x years refractory to trazodone, doxepin, melatonin  Depressive or anxiety Sx: No  Any change in pattern of HA? More frequent, more severe  Triggers: Smells (cheeses, cologne, perfumes, odors)   Alleviating factors: Fioricet + APAP with mild effect  FHx HA/migraine: Yes, mother, maternal aunt, daughter     Rescue medication use: Fioricet, APAP  Preventative medication use: None     Investigations so far: None       Past Medical History:   Diagnosis Date    Arthritis     Bowel obstruction (HCC)     Chronic pain     Diverticulitis     Hypercholesteremia     Hypertension     Migraines     Sickle cell trait (HCC)     Stroke (HCC) 1993    \"LEFT SIDE

## 2025-01-07 ENCOUNTER — OFFICE VISIT (OUTPATIENT)
Age: 70
End: 2025-01-07
Payer: MEDICARE

## 2025-01-07 VITALS
BODY MASS INDEX: 42.88 KG/M2 | SYSTOLIC BLOOD PRESSURE: 122 MMHG | OXYGEN SATURATION: 98 % | HEART RATE: 86 BPM | WEIGHT: 242 LBS | DIASTOLIC BLOOD PRESSURE: 62 MMHG | HEIGHT: 63 IN | RESPIRATION RATE: 16 BRPM

## 2025-01-07 DIAGNOSIS — R26.89 BALANCE PROBLEM: ICD-10-CM

## 2025-01-07 DIAGNOSIS — R51.9 WORSENING HEADACHES: ICD-10-CM

## 2025-01-07 DIAGNOSIS — G47.9 SLEEPING DIFFICULTY: ICD-10-CM

## 2025-01-07 DIAGNOSIS — G43.009 MIGRAINE WITHOUT AURA AND WITHOUT STATUS MIGRAINOSUS, NOT INTRACTABLE: Primary | ICD-10-CM

## 2025-01-07 DIAGNOSIS — Z86.73 HISTORY OF STROKE: ICD-10-CM

## 2025-01-07 PROCEDURE — 99204 OFFICE O/P NEW MOD 45 MIN: CPT

## 2025-01-07 PROCEDURE — G8427 DOCREV CUR MEDS BY ELIG CLIN: HCPCS

## 2025-01-07 PROCEDURE — 1090F PRES/ABSN URINE INCON ASSESS: CPT

## 2025-01-07 PROCEDURE — 1159F MED LIST DOCD IN RCRD: CPT

## 2025-01-07 PROCEDURE — 1160F RVW MEDS BY RX/DR IN RCRD: CPT

## 2025-01-07 PROCEDURE — 1123F ACP DISCUSS/DSCN MKR DOCD: CPT

## 2025-01-07 PROCEDURE — 3078F DIAST BP <80 MM HG: CPT

## 2025-01-07 PROCEDURE — G8400 PT W/DXA NO RESULTS DOC: HCPCS

## 2025-01-07 PROCEDURE — 3074F SYST BP LT 130 MM HG: CPT

## 2025-01-07 PROCEDURE — 3017F COLORECTAL CA SCREEN DOC REV: CPT

## 2025-01-07 PROCEDURE — G8417 CALC BMI ABV UP PARAM F/U: HCPCS

## 2025-01-07 PROCEDURE — 1036F TOBACCO NON-USER: CPT

## 2025-01-07 RX ORDER — NORTRIPTYLINE HYDROCHLORIDE 10 MG/1
10 CAPSULE ORAL NIGHTLY
Qty: 30 CAPSULE | Refills: 3 | Status: SHIPPED | OUTPATIENT
Start: 2025-01-07

## 2025-01-07 ASSESSMENT — PATIENT HEALTH QUESTIONNAIRE - PHQ9
SUM OF ALL RESPONSES TO PHQ QUESTIONS 1-9: 0
SUM OF ALL RESPONSES TO PHQ QUESTIONS 1-9: 0
SUM OF ALL RESPONSES TO PHQ9 QUESTIONS 1 & 2: 0
SUM OF ALL RESPONSES TO PHQ QUESTIONS 1-9: 0
2. FEELING DOWN, DEPRESSED OR HOPELESS: NOT AT ALL
1. LITTLE INTEREST OR PLEASURE IN DOING THINGS: NOT AT ALL
SUM OF ALL RESPONSES TO PHQ QUESTIONS 1-9: 0

## 2025-01-07 ASSESSMENT — ENCOUNTER SYMPTOMS
VOMITING: 1
NAUSEA: 1
PHOTOPHOBIA: 1

## 2025-01-08 ENCOUNTER — TELEPHONE (OUTPATIENT)
Age: 70
End: 2025-01-08

## 2025-01-08 NOTE — TELEPHONE ENCOUNTER
Re: Nurte [facility]    Rcvd PA in epic, Key# [id number], submitted and awaiting outcome.    01/10/25: Rcvd denial, case# not listed on letter, per letter preferred med is Ubrelvy. Sent update to nurse. Scanned to chart.

## 2025-01-13 DIAGNOSIS — G43.709 CHRONIC MIGRAINE WITHOUT AURA, NOT INTRACTABLE, WITHOUT STATUS MIGRAINOSUS: ICD-10-CM

## 2025-01-13 DIAGNOSIS — G43.009 MIGRAINE WITHOUT AURA AND WITHOUT STATUS MIGRAINOSUS, NOT INTRACTABLE: Primary | ICD-10-CM

## 2025-01-13 RX ORDER — UBROGEPANT 50 MG/1
50 TABLET ORAL DAILY PRN
Qty: 10 TABLET | Refills: 4 | Status: SHIPPED | OUTPATIENT
Start: 2025-01-13

## 2025-01-13 RX ORDER — UBROGEPANT 50 MG/1
50 TABLET ORAL DAILY PRN
Qty: 8 TABLET | Refills: 4 | Status: SHIPPED | OUTPATIENT
Start: 2025-01-13 | End: 2025-01-13

## 2025-01-22 ENCOUNTER — HOSPITAL ENCOUNTER (OUTPATIENT)
Facility: HOSPITAL | Age: 70
Discharge: HOME | End: 2025-01-25
Payer: MEDICARE

## 2025-01-22 DIAGNOSIS — R26.89 BALANCE PROBLEM: ICD-10-CM

## 2025-01-22 DIAGNOSIS — R51.9 WORSENING HEADACHES: ICD-10-CM

## 2025-01-22 DIAGNOSIS — G43.009 MIGRAINE WITHOUT AURA AND WITHOUT STATUS MIGRAINOSUS, NOT INTRACTABLE: ICD-10-CM

## 2025-01-22 PROCEDURE — 70551 MRI BRAIN STEM W/O DYE: CPT

## 2025-01-23 RX ORDER — POTASSIUM CHLORIDE 1500 MG/1
TABLET, EXTENDED RELEASE ORAL
Qty: 30 TABLET | Refills: 0 | Status: SHIPPED | OUTPATIENT
Start: 2025-01-23

## 2025-01-28 ENCOUNTER — TELEPHONE (OUTPATIENT)
Age: 70
End: 2025-01-28

## 2025-01-28 DIAGNOSIS — D35.2 PITUITARY MACROADENOMA (HCC): Primary | ICD-10-CM

## 2025-01-28 RX ORDER — CYCLOBENZAPRINE HCL 10 MG
TABLET ORAL
Qty: 30 TABLET | Refills: 0 | Status: SHIPPED | OUTPATIENT
Start: 2025-01-28

## 2025-01-28 NOTE — TELEPHONE ENCOUNTER
Called patient. Informed her that the provider reviewed her MRI brain stating, \"Results reviewed.     MRI brain WO is negative for acute process. It does show a pituitary mass, suggestive of a pituitary adenoma (non cancerous). We should proceed with labs to evaluate if the tumor is active (meaning creating hormonal changes). I will place the orders now. The Radiologist also suggested a repeated MRI brain W and WO contrast to further evaluate pituitary adenoma, will place orders now.\"    Labs were sent to her through [employer] messages and she will schedule her MRI when she is back in town.

## 2025-02-04 NOTE — TELEPHONE ENCOUNTER
Attempted to call pt to clarify which medications she needed, but to no avail, left her a message to call office back. Anemia noted. Tsat 21, Ferritin 160. B12 and Folate ok. Recommend venofer 200mg X 5 days if no concerns for infxn. Recommend age appropriate screening. Monitor Hgb, transfusion per primary team.

## 2025-02-17 NOTE — TELEPHONE ENCOUNTER
PCP: Coco Sun APRN - NP    Last appt: 6/27/2024     Future Appointments   Date Time Provider Department Center   4/4/2025 11:30 AM Joann Grimaldo APRN - NP [facility]       Requested Prescriptions     Pending Prescriptions Disp Refills    potassium chloride (KLOR-CON M) 20 MEQ extended release tablet [Pharmacy Med Name: Potassium Chloride [place] ER Oral Tablet Extended Release 20 MEQ] 30 tablet 11     Sig: TAKE 1 TABLET EVERY DAY -- (MD APPOINTMENT IS NEEDED)       Prior labs and Blood pressures:  BP Readings from Last 3 Encounters:   01/07/25 122/62   06/27/24 125/81   10/16/23 102/70     Lab Results   Component Value Date/Time     06/27/2024 09:34 AM    K 4.1 06/27/2024 09:34 AM     06/27/2024 09:34 AM    CO2 28 06/27/2024 09:34 AM    BUN 11 06/27/2024 09:34 AM    GFRAA 48 07/25/2022 11:56 AM     No results found for: \"HBA1C\", \"[id number]\"  Lab Results   Component Value Date/Time    CHOL 164 06/27/2024 09:34 AM    HDL 56 06/27/2024 09:34 AM    LDL 75.2 06/27/2024 09:34 AM     10/16/2023 02:48 PM    VLDL 32.8 06/27/2024 09:34 AM     No results found for: \"VITD3\"    No results found for: \"TSH\", \"TSH2\", \"TSH3\"

## 2025-02-18 RX ORDER — CYCLOBENZAPRINE HCL 10 MG
TABLET ORAL
Qty: 30 TABLET | Refills: 11 | OUTPATIENT
Start: 2025-02-18

## 2025-02-19 RX ORDER — POTASSIUM CHLORIDE 1500 MG/1
TABLET, EXTENDED RELEASE ORAL
Qty: 30 TABLET | Refills: 0 | Status: SHIPPED | OUTPATIENT
Start: 2025-02-19

## 2025-02-20 ENCOUNTER — TELEPHONE (OUTPATIENT)
Age: 70
End: 2025-02-20

## 2025-02-20 NOTE — TELEPHONE ENCOUNTER
Called patient. No answer. Left a VM asking if this is a [facility] or a family practice? If so, asked if I get the exact name so I can see where I need to fax it to unless patient has a fax number.

## 2025-02-20 NOTE — TELEPHONE ENCOUNTER
Patient would like her orders for lab work to be sent to the [facility] on [address].    Patient would like a call back to let her know when this has been done.

## 2025-02-27 ENCOUNTER — OFFICE VISIT (OUTPATIENT)
Age: 70
End: 2025-02-27
Payer: MEDICARE

## 2025-02-27 VITALS
RESPIRATION RATE: 16 BRPM | TEMPERATURE: 97.5 F | BODY MASS INDEX: 41.82 KG/M2 | WEIGHT: 236 LBS | HEIGHT: 63 IN | DIASTOLIC BLOOD PRESSURE: 72 MMHG | OXYGEN SATURATION: 97 % | HEART RATE: 82 BPM | SYSTOLIC BLOOD PRESSURE: 108 MMHG

## 2025-02-27 DIAGNOSIS — E66.01 MORBID OBESITY WITH BMI OF 40.0-44.9, ADULT: ICD-10-CM

## 2025-02-27 DIAGNOSIS — G43.709 CHRONIC MIGRAINE WITHOUT AURA WITHOUT STATUS MIGRAINOSUS, NOT INTRACTABLE: ICD-10-CM

## 2025-02-27 DIAGNOSIS — F51.01 PRIMARY INSOMNIA: ICD-10-CM

## 2025-02-27 DIAGNOSIS — E78.00 HYPERCHOLESTEROLEMIA: ICD-10-CM

## 2025-02-27 DIAGNOSIS — I10 BENIGN ESSENTIAL HTN: Primary | ICD-10-CM

## 2025-02-27 DIAGNOSIS — R73.01 ELEVATED FASTING BLOOD SUGAR: ICD-10-CM

## 2025-02-27 DIAGNOSIS — D35.2 PITUITARY MACROADENOMA (HCC): ICD-10-CM

## 2025-02-27 DIAGNOSIS — Z86.73 HISTORY OF STROKE: ICD-10-CM

## 2025-02-27 DIAGNOSIS — N18.31 STAGE 3A CHRONIC KIDNEY DISEASE (HCC): ICD-10-CM

## 2025-02-27 DIAGNOSIS — Z12.31 ENCOUNTER FOR SCREENING MAMMOGRAM FOR MALIGNANT NEOPLASM OF BREAST: ICD-10-CM

## 2025-02-27 DIAGNOSIS — Z78.0 POST-MENOPAUSAL: ICD-10-CM

## 2025-02-27 PROBLEM — E23.6 PITUITARY MASS: Status: ACTIVE | Noted: 2025-02-27

## 2025-02-27 PROCEDURE — 3074F SYST BP LT 130 MM HG: CPT | Performed by: NURSE PRACTITIONER

## 2025-02-27 PROCEDURE — 99214 OFFICE O/P EST MOD 30 MIN: CPT | Performed by: NURSE PRACTITIONER

## 2025-02-27 PROCEDURE — 3078F DIAST BP <80 MM HG: CPT | Performed by: NURSE PRACTITIONER

## 2025-02-27 PROCEDURE — G8400 PT W/DXA NO RESULTS DOC: HCPCS | Performed by: NURSE PRACTITIONER

## 2025-02-27 PROCEDURE — G8417 CALC BMI ABV UP PARAM F/U: HCPCS | Performed by: NURSE PRACTITIONER

## 2025-02-27 PROCEDURE — 1090F PRES/ABSN URINE INCON ASSESS: CPT | Performed by: NURSE PRACTITIONER

## 2025-02-27 PROCEDURE — 1036F TOBACCO NON-USER: CPT | Performed by: NURSE PRACTITIONER

## 2025-02-27 PROCEDURE — 3017F COLORECTAL CA SCREEN DOC REV: CPT | Performed by: NURSE PRACTITIONER

## 2025-02-27 PROCEDURE — 1160F RVW MEDS BY RX/DR IN RCRD: CPT | Performed by: NURSE PRACTITIONER

## 2025-02-27 PROCEDURE — 1126F AMNT PAIN NOTED NONE PRSNT: CPT | Performed by: NURSE PRACTITIONER

## 2025-02-27 PROCEDURE — G8427 DOCREV CUR MEDS BY ELIG CLIN: HCPCS | Performed by: NURSE PRACTITIONER

## 2025-02-27 PROCEDURE — 1159F MED LIST DOCD IN RCRD: CPT | Performed by: NURSE PRACTITIONER

## 2025-02-27 PROCEDURE — 1123F ACP DISCUSS/DSCN MKR DOCD: CPT | Performed by: NURSE PRACTITIONER

## 2025-02-27 SDOH — ECONOMIC STABILITY: FOOD INSECURITY: WITHIN THE PAST 12 MONTHS, THE FOOD YOU BOUGHT JUST DIDN'T LAST AND YOU DIDN'T HAVE MONEY TO GET MORE.: NEVER TRUE

## 2025-02-27 SDOH — ECONOMIC STABILITY: FOOD INSECURITY: WITHIN THE PAST 12 MONTHS, YOU WORRIED THAT YOUR FOOD WOULD RUN OUT BEFORE YOU GOT MONEY TO BUY MORE.: NEVER TRUE

## 2025-02-27 ASSESSMENT — PATIENT HEALTH QUESTIONNAIRE - PHQ9
SUM OF ALL RESPONSES TO PHQ QUESTIONS 1-9: 0
2. FEELING DOWN, DEPRESSED OR HOPELESS: NOT AT ALL
1. LITTLE INTEREST OR PLEASURE IN DOING THINGS: NOT AT ALL
SUM OF ALL RESPONSES TO PHQ QUESTIONS 1-9: 0
SUM OF ALL RESPONSES TO PHQ QUESTIONS 1-9: 0
SUM OF ALL RESPONSES TO PHQ9 QUESTIONS 1 & 2: 0
SUM OF ALL RESPONSES TO PHQ QUESTIONS 1-9: 0

## 2025-02-27 ASSESSMENT — ENCOUNTER SYMPTOMS
CHEST TIGHTNESS: 0
SHORTNESS OF BREATH: 0

## 2025-02-27 NOTE — ASSESSMENT & PLAN NOTE
At goal with last FLP.  Continue statin.  Recheck fasting labs.  Orders:    Comprehensive Metabolic Panel; Future    Lipid Panel; Future    Lipid Panel    Comprehensive Metabolic Panel

## 2025-02-27 NOTE — PROGRESS NOTES
Subjective:      Patient ID: Nery Murguia is a 69 y.o. female     HPI  Overdue follow up chronic health problems.   HTN. Taking prescribed meds.  Hypercholesterolemia.  Admits to not consistently following a healthy diet and does not exercise on a regular basis.  Taking prescribed lipitor.  Pre diabetes.  Has reduced sugar and processed carbs in diet. Last A1C 6.1.  Dependent edema.  Taking lasix as prescribed with good sx control.    CKD.  Mildly elevated creatinine with last labs.    Migraine headaches. Has headaches about 2-3 x month.  Takes fioricet and sometimes flexeril for more severe symptoms.  Tries to avoid triggers AMAP.  Recently saw neurologist for evaluation.  MRI showed pituitary mass.  Began nortriptyline for prevention.  Ubrelvy approval pending.    History of CVA 1993.  C/o having some issues with disequilibrium lately.  Had symptoms years ago after having stroke.  Having some difficulty ambulating on uneven surfaces.   Has some episodes of vertigo and dizziness at times.    Denies any focal neuro deficits.    C/o persistent insomnia.  Has tried trazodone, melatonin.  Recently prescribed doxepin.  Using prn.    Due for mammogram and Dexa.      Patient Active Problem List   Diagnosis    Pre-diabetes    Benign essential HTN    Hypercholesterolemia    Morbid obesity with BMI of 40.0-44.9, adult    Primary insomnia    Dependent edema    History of stroke    Chronic migraine without aura without status migrainosus, not intractable    Pituitary macroadenoma (HCC)    Elevated fasting blood sugar     Current Outpatient Medications   Medication Sig    potassium chloride (KLOR-CON M) 20 MEQ extended release tablet TAKE 1 TABLET EVERY DAY -- (MD APPOINTMENT IS NEEDED)    cyclobenzaprine (FLEXERIL) 10 MG tablet TAKE 1 TABLET EVERY NIGHT AS NEEDED FOR MUSCLE SPASM(S). APPT DUE    Ubrogepant (UBRELVY) 50 MG TABS Take 50 mg by mouth daily as needed (migraine)    nortriptyline (PAMELOR) 10 MG capsule Take 1

## 2025-02-27 NOTE — ASSESSMENT & PLAN NOTE
Having mild exacerbation of symptoms.  Trial nortriptyline as prescribed.  Follow-up with neurology as scheduled.

## 2025-02-27 NOTE — ASSESSMENT & PLAN NOTE
Newly diagnosed.  Follow-up with neurology as scheduled.  Orders:    FSH & LH    Insulin-Like Growth Factor    Prolactin    ACTH

## 2025-02-27 NOTE — ASSESSMENT & PLAN NOTE
Well-controlled.  Continue current medications.  Orders:    Comprehensive Metabolic Panel; Future    Comprehensive Metabolic Panel

## 2025-02-27 NOTE — ASSESSMENT & PLAN NOTE
Last A1c at 6.1.  Reviewed diabetic diet and carbohydrate restriction.    Orders:    Hemoglobin A1C; Future    Hemoglobin A1C

## 2025-02-27 NOTE — PROGRESS NOTES
Chief Complaint   Patient presents with    Hypertension    Cholesterol Problem     \"Have you been to the ER, urgent care clinic since your last visit?  Hospitalized since your last visit?\"      no  “Have you seen or consulted any other health care providers outside our system since your last visit?”      Neurology   Have you had a mammogram?”   no    Date of last Mammogram: 3/1/2021     Having frequent headaches

## 2025-02-28 LAB
ALBUMIN SERPL-MCNC: 4.1 G/DL (ref 3.5–5)
ALBUMIN/GLOB SERPL: 1.1 (ref 1.1–2.2)
ALP SERPL-CCNC: 103 U/L (ref 45–117)
ALT SERPL-CCNC: 21 U/L (ref 12–78)
ANION GAP SERPL CALC-SCNC: 6 MMOL/L (ref 2–12)
AST SERPL-CCNC: 16 U/L (ref 15–37)
BILIRUB SERPL-MCNC: 0.6 MG/DL (ref 0.2–1)
BUN SERPL-MCNC: 22 MG/DL (ref 6–20)
BUN/CREAT SERPL: 16 (ref 12–20)
CALCIUM SERPL-MCNC: 9.9 MG/DL (ref 8.5–10.1)
CHLORIDE SERPL-SCNC: 105 MMOL/L (ref 97–108)
CHOLEST SERPL-MCNC: 154 MG/DL
CO2 SERPL-SCNC: 28 MMOL/L (ref 21–32)
CREAT SERPL-MCNC: 1.41 MG/DL (ref 0.55–1.02)
EST. AVERAGE GLUCOSE BLD GHB EST-MCNC: 126 MG/DL
FSH SERPL-ACNC: 41.7 MIU/ML
GLOBULIN SER CALC-MCNC: 3.8 G/DL (ref 2–4)
GLUCOSE SERPL-MCNC: 103 MG/DL (ref 65–100)
HBA1C MFR BLD: 6 % (ref 4–5.6)
HDLC SERPL-MCNC: 60 MG/DL
HDLC SERPL: 2.6 (ref 0–5)
LDLC SERPL CALC-MCNC: 74.4 MG/DL (ref 0–100)
LH SERPL-ACNC: 13.7 MIU/ML
POTASSIUM SERPL-SCNC: 3.2 MMOL/L (ref 3.5–5.1)
PROLACTIN SERPL-MCNC: 8.8 NG/ML
PROT SERPL-MCNC: 7.9 G/DL (ref 6.4–8.2)
SODIUM SERPL-SCNC: 139 MMOL/L (ref 136–145)
TRIGL SERPL-MCNC: 98 MG/DL
VLDLC SERPL CALC-MCNC: 19.6 MG/DL

## 2025-03-01 LAB
ACTH PLAS-MCNC: 125 PG/ML (ref 7.2–63.3)
IGF-I SERPL-MCNC: 197 NG/ML (ref 52–196)

## 2025-03-03 ENCOUNTER — TELEPHONE (OUTPATIENT)
Age: 70
End: 2025-03-03

## 2025-03-03 NOTE — TELEPHONE ENCOUNTER
TC to patient.  2 identifiers confirmed.  Informed of recent test results.  Potassium is low.  A1C remains in pre diabetes range.  Kidney function test is elevated. Patient is currently on 2 diuretics.  Advised to hold lasix.  Continue HCTZ and potassium supplement.  Limit sugar and processed carbs in diet.  Schedule follow up 2 months to recheck.

## 2025-03-04 DIAGNOSIS — D35.2 PITUITARY MACROADENOMA (HCC): Primary | ICD-10-CM

## 2025-03-07 ENCOUNTER — TELEPHONE (OUTPATIENT)
Age: 70
End: 2025-03-07

## 2025-03-07 NOTE — TELEPHONE ENCOUNTER
Called patient. No answer. Left a VM stating Joann NP reviewed her labs stating, \"I reviewed the results of your recent labs. Your insulin like growth factor is a little elevated and your ACTH (another hormone) is also elevated. Other labs were normal. This suggests the pituitary mass seen on the MRI 1/22/25 is causing hormonal changes. I will refer you to Neurosurgery to further discuss. Please complete the MRI of the brain with contrast as previously ordered to best determine next steps. Please let me know if you have questions or concerns about your results.\"    I left the info for Dr. Nadine Ochoa and faxed over the referral to Dr. Ochoa

## 2025-03-10 ENCOUNTER — TELEPHONE (OUTPATIENT)
Age: 70
End: 2025-03-10

## 2025-03-10 DIAGNOSIS — F40.240 CLAUSTROPHOBIA: Primary | ICD-10-CM

## 2025-03-10 RX ORDER — LORAZEPAM 1 MG/1
TABLET ORAL
Qty: 2 TABLET | Refills: 0 | Status: SHIPPED | OUTPATIENT
Start: 2025-03-10 | End: 2025-03-31

## 2025-03-10 NOTE — TELEPHONE ENCOUNTER
States she has a MRI coming up next Tuesday 3/18/25. States she is claustrophobic and requesting medication VALIUM 10 mg. Same amount received from previous MRI.

## 2025-03-18 ENCOUNTER — HOSPITAL ENCOUNTER (OUTPATIENT)
Facility: HOSPITAL | Age: 70
Discharge: HOME | End: 2025-03-21
Payer: MEDICARE

## 2025-03-18 DIAGNOSIS — D35.2 PITUITARY MACROADENOMA (HCC): ICD-10-CM

## 2025-03-18 PROCEDURE — 6360000004 HC RX CONTRAST MEDICATION: Performed by: NURSE PRACTITIONER

## 2025-03-18 PROCEDURE — A9579 GAD-BASE MR CONTRAST NOS,1ML: HCPCS | Performed by: NURSE PRACTITIONER

## 2025-03-18 PROCEDURE — 70553 MRI BRAIN STEM W/O & W/DYE: CPT

## 2025-03-18 RX ADMIN — GADOTERIDOL 20 ML: 279.3 INJECTION, SOLUTION INTRAVENOUS at 20:59

## 2025-03-27 ENCOUNTER — RESULTS FOLLOW-UP (OUTPATIENT)
Age: 70
End: 2025-03-27

## 2025-03-27 RX ORDER — CYCLOBENZAPRINE HCL 10 MG
10 TABLET ORAL NIGHTLY PRN
Qty: 90 TABLET | Refills: 0 | Status: SHIPPED | OUTPATIENT
Start: 2025-03-27

## 2025-03-27 NOTE — TELEPHONE ENCOUNTER
Patient call today for refill- couldn't make out name.     Contacted patient- needs cyclobenzaprine to [facility].  Thanks, Kerry    Last appointment: 2/27/25 [place]  Next appointment: None  Previous refill encounter(s): 1/28/25 30    Requested Prescriptions     Pending Prescriptions Disp Refills    cyclobenzaprine (FLEXERIL) 10 MG tablet 90 tablet 0     Sig: Take 1 tablet by mouth nightly as needed for Muscle spasms     For Pharmacy Admin Tracking Only    Program: Medication Refill  CPA in place:    Recommendation Provided To:   Intervention Detail: New Rx: 1, reason: Patient Preference  Intervention Accepted By:   Gap Closed?:    Time Spent (min): 5

## 2025-03-28 ENCOUNTER — TELEPHONE (OUTPATIENT)
Age: 70
End: 2025-03-28

## 2025-03-28 NOTE — TELEPHONE ENCOUNTER
Called patient. Informed her that NP reviewed her MRI of the brain stating, \"MRI brain with and without contrast shows right pituitary macroadenoma. Please follow up with Neurosurgery as referred for further recommendations.\"    She has an appt with neurosurgery on April 14th

## 2025-04-04 ENCOUNTER — OFFICE VISIT (OUTPATIENT)
Age: 70
End: 2025-04-04
Payer: MEDICARE

## 2025-04-04 VITALS
SYSTOLIC BLOOD PRESSURE: 104 MMHG | OXYGEN SATURATION: 98 % | HEIGHT: 63 IN | RESPIRATION RATE: 16 BRPM | HEART RATE: 98 BPM | DIASTOLIC BLOOD PRESSURE: 64 MMHG | BODY MASS INDEX: 40.93 KG/M2 | WEIGHT: 231 LBS

## 2025-04-04 DIAGNOSIS — D35.2 PITUITARY MACROADENOMA (HCC): Primary | ICD-10-CM

## 2025-04-04 DIAGNOSIS — G43.009 MIGRAINE WITHOUT AURA AND WITHOUT STATUS MIGRAINOSUS, NOT INTRACTABLE: ICD-10-CM

## 2025-04-04 PROCEDURE — G8400 PT W/DXA NO RESULTS DOC: HCPCS

## 2025-04-04 PROCEDURE — 3017F COLORECTAL CA SCREEN DOC REV: CPT

## 2025-04-04 PROCEDURE — 1159F MED LIST DOCD IN RCRD: CPT

## 2025-04-04 PROCEDURE — 3074F SYST BP LT 130 MM HG: CPT

## 2025-04-04 PROCEDURE — 1123F ACP DISCUSS/DSCN MKR DOCD: CPT

## 2025-04-04 PROCEDURE — 1090F PRES/ABSN URINE INCON ASSESS: CPT

## 2025-04-04 PROCEDURE — 1036F TOBACCO NON-USER: CPT

## 2025-04-04 PROCEDURE — 99215 OFFICE O/P EST HI 40 MIN: CPT

## 2025-04-04 PROCEDURE — G8417 CALC BMI ABV UP PARAM F/U: HCPCS

## 2025-04-04 PROCEDURE — G8427 DOCREV CUR MEDS BY ELIG CLIN: HCPCS

## 2025-04-04 PROCEDURE — 1160F RVW MEDS BY RX/DR IN RCRD: CPT

## 2025-04-04 PROCEDURE — 3078F DIAST BP <80 MM HG: CPT

## 2025-04-04 RX ORDER — UBROGEPANT 100 MG/1
100 TABLET ORAL DAILY PRN
Qty: 16 TABLET | Refills: 3 | Status: ACTIVE | OUTPATIENT
Start: 2025-04-04

## 2025-04-04 RX ORDER — UBROGEPANT 100 MG/1
100 TABLET ORAL DAILY PRN
Qty: 16 TABLET | Refills: 3 | Status: SHIPPED | OUTPATIENT
Start: 2025-04-04 | End: 2025-04-04 | Stop reason: SDUPTHER

## 2025-04-04 ASSESSMENT — PATIENT HEALTH QUESTIONNAIRE - PHQ9
1. LITTLE INTEREST OR PLEASURE IN DOING THINGS: NOT AT ALL
SUM OF ALL RESPONSES TO PHQ QUESTIONS 1-9: 0
2. FEELING DOWN, DEPRESSED OR HOPELESS: NOT AT ALL
SUM OF ALL RESPONSES TO PHQ QUESTIONS 1-9: 0

## 2025-04-04 ASSESSMENT — VISUAL ACUITY: VA_NORMAL: 1

## 2025-06-09 RX ORDER — CYCLOBENZAPRINE HCL 10 MG
TABLET ORAL
Qty: 90 TABLET | Refills: 0 | Status: SHIPPED | OUTPATIENT
Start: 2025-06-09

## 2025-06-09 NOTE — TELEPHONE ENCOUNTER
PCP: Coco Sun, APRN - NP    Last appt: 2/27/2025     Future Appointments   Date Time Provider Department Center   7/16/2025 10:30 AM Malinda Gutiérrez MD RDE [facility]   10/2/2025  1:30 PM Joann Grimaldo APRN - [facility]       Requested Prescriptions     Pending Prescriptions Disp Refills    cyclobenzaprine (FLEXERIL) 10 MG tablet [Pharmacy Med Name: Cyclobenzaprine HCl Oral Tablet 10 MG] 90 tablet 3     Sig: TAKE 1 TABLET EVERY NIGHT AS NEEDED FOR MUSCLE SPASM(S)       Prior labs and Blood pressures:  BP Readings from Last 3 Encounters:   04/04/25 104/64   02/27/25 108/72   01/07/25 122/62     Lab Results   Component Value Date/Time     02/27/2025 09:58 AM    K 3.2 02/27/2025 09:58 AM     02/27/2025 09:58 AM    CO2 28 02/27/2025 09:58 AM    BUN 22 02/27/2025 09:58 AM    GFRAA 48 07/25/2022 11:56 AM     No results found for: \"HBA1C\", \"QOH8VQRS\"  Lab Results   Component Value Date/Time    CHOL 154 02/27/2025 09:58 AM    HDL 60 02/27/2025 09:58 AM    LDL 74.4 02/27/2025 09:58 AM     10/16/2023 02:48 PM    VLDL 19.6 02/27/2025 09:58 AM     No results found for: \"VITD3\"    No results found for: \"TSH\", \"TSH2\", \"TSH3\"

## 2025-06-30 RX ORDER — HYDROCHLOROTHIAZIDE 25 MG/1
25 TABLET ORAL DAILY
Qty: 90 TABLET | Refills: 0 | Status: SHIPPED | OUTPATIENT
Start: 2025-06-30

## 2025-06-30 RX ORDER — ATORVASTATIN CALCIUM 20 MG/1
20 TABLET, FILM COATED ORAL DAILY
Qty: 90 TABLET | Refills: 0 | Status: SHIPPED | OUTPATIENT
Start: 2025-06-30

## 2025-06-30 RX ORDER — ENALAPRIL MALEATE 10 MG/1
10 TABLET ORAL DAILY
Qty: 90 TABLET | Refills: 0 | Status: SHIPPED | OUTPATIENT
Start: 2025-06-30

## 2025-06-30 RX ORDER — FUROSEMIDE 20 MG/1
20 TABLET ORAL DAILY
Qty: 90 TABLET | Refills: 0 | Status: SHIPPED | OUTPATIENT
Start: 2025-06-30

## 2025-06-30 NOTE — TELEPHONE ENCOUNTER
Patient call stating Silvia has been reaching out w/no response- do not show any requests for medications.  (Contacted patient to check/advise- advised may be going to incorrect provider). Thanks, Kerry    Last appointment: 2/27/25 [place], labs 2/2025  Next appointment: None  Previous refill encounter(s): 6/28/24 90 + 1 (all 4)    Requested Prescriptions     Pending Prescriptions Disp Refills    atorvastatin (LIPITOR) 20 MG tablet 90 tablet 1     Sig: Take 1 tablet by mouth daily    enalapril (VASOTEC) 10 MG tablet 90 tablet 1     Sig: Take 1 tablet by mouth daily    hydroCHLOROthiazide (HYDRODIURIL) 25 MG tablet 90 tablet 1     Sig: Take 1 tablet by mouth daily    furosemide (LASIX) 20 MG tablet 90 tablet 1     Sig: Take 1 tablet by mouth daily     For Pharmacy Admin Tracking Only    Program: Medication Refill  CPA in place:    Recommendation Provided To:   Intervention Detail: New Rx: 4, reason: Patient Preference  Intervention Accepted By:   Gap Closed?:    Time Spent (min): 10

## 2025-07-16 ENCOUNTER — TELEPHONE (OUTPATIENT)
Age: 70
End: 2025-07-16

## 2025-07-16 ENCOUNTER — OFFICE VISIT (OUTPATIENT)
Age: 70
End: 2025-07-16
Payer: MEDICARE

## 2025-07-16 VITALS
OXYGEN SATURATION: 99 % | DIASTOLIC BLOOD PRESSURE: 83 MMHG | HEART RATE: 84 BPM | BODY MASS INDEX: 43.05 KG/M2 | HEIGHT: 63 IN | WEIGHT: 243 LBS | SYSTOLIC BLOOD PRESSURE: 135 MMHG

## 2025-07-16 DIAGNOSIS — D35.2 PITUITARY MACROADENOMA (HCC): ICD-10-CM

## 2025-07-16 DIAGNOSIS — D35.2 PITUITARY MACROADENOMA (HCC): Primary | ICD-10-CM

## 2025-07-16 PROCEDURE — 1125F AMNT PAIN NOTED PAIN PRSNT: CPT | Performed by: INTERNAL MEDICINE

## 2025-07-16 PROCEDURE — 99204 OFFICE O/P NEW MOD 45 MIN: CPT | Performed by: INTERNAL MEDICINE

## 2025-07-16 PROCEDURE — G2211 COMPLEX E/M VISIT ADD ON: HCPCS | Performed by: INTERNAL MEDICINE

## 2025-07-16 PROCEDURE — 3079F DIAST BP 80-89 MM HG: CPT | Performed by: INTERNAL MEDICINE

## 2025-07-16 PROCEDURE — G8400 PT W/DXA NO RESULTS DOC: HCPCS | Performed by: INTERNAL MEDICINE

## 2025-07-16 PROCEDURE — 3017F COLORECTAL CA SCREEN DOC REV: CPT | Performed by: INTERNAL MEDICINE

## 2025-07-16 PROCEDURE — 1090F PRES/ABSN URINE INCON ASSESS: CPT | Performed by: INTERNAL MEDICINE

## 2025-07-16 PROCEDURE — 1123F ACP DISCUSS/DSCN MKR DOCD: CPT | Performed by: INTERNAL MEDICINE

## 2025-07-16 PROCEDURE — 3075F SYST BP GE 130 - 139MM HG: CPT | Performed by: INTERNAL MEDICINE

## 2025-07-16 PROCEDURE — G8427 DOCREV CUR MEDS BY ELIG CLIN: HCPCS | Performed by: INTERNAL MEDICINE

## 2025-07-16 PROCEDURE — 1036F TOBACCO NON-USER: CPT | Performed by: INTERNAL MEDICINE

## 2025-07-16 PROCEDURE — G8417 CALC BMI ABV UP PARAM F/U: HCPCS | Performed by: INTERNAL MEDICINE

## 2025-07-16 PROCEDURE — 1159F MED LIST DOCD IN RCRD: CPT | Performed by: INTERNAL MEDICINE

## 2025-07-16 NOTE — PROGRESS NOTES
Identified pt with two pt identifiers(name and ). Reviewed record in preparation for visit and have obtained necessary documentation. All patient medications has been reviewed.  Chief Complaint   Patient presents with    New Patient     Pituitary macroedenoma.       Wt Readings from Last 3 Encounters:   25 110.2 kg (243 lb)   25 104.8 kg (231 lb)   25 107 kg (236 lb)     Temp Readings from Last 3 Encounters:   25 97.5 °F (36.4 °C) (Skin)   24 97.6 °F (36.4 °C)   10/16/23 98.1 °F (36.7 °C) (Temporal)     BP Readings from Last 3 Encounters:   25 135/83   25 104/64   25 108/72     Pulse Readings from Last 3 Encounters:   25 84   25 98   25 82       Have you been to the ER, urgent care clinic since your last visit?  Hospitalized since your last visit?   NO    Have you seen or consulted any other health care providers outside our system since your last visit?   NO      
daily (Patient not taking: Reported on 7/16/2025)    nortriptyline (PAMELOR) 10 MG capsule Take 1 capsule by mouth nightly (Patient not taking: Reported on 7/16/2025)    butalbital-acetaminophen-caffeine (FIORICET, ESGIC) -40 MG per tablet TAKE 1 TABLET EVERY 6 HOURS AS NEEDED FOR HEADACHES (Patient not taking: Reported on 7/16/2025)    doxepin (SINEQUAN) 10 MG capsule Take 1 capsule by mouth nightly (Patient not taking: Reported on 7/16/2025)     No current facility-administered medications for this visit.     Allergies   Allergen Reactions    Aspirin Nausea Only and Other (See Comments)     Family History   Problem Relation Age of Onset    Diabetes Sister     Anesth Problems Neg Hx     Asthma Sister     Cancer Brother         Prostate    Sickle Cell Anemia Brother     Cancer Paternal Uncle         colon    Sickle Cell Anemia Brother     Cancer Sister 34        Colon    Cancer Father 40        Colon       Social Hx:   Social Connections: Not on file         Review of Systems:  See HPI    Physical Examination:  /83 (BP Site: Left Upper Arm, Patient Position: Sitting)   Pulse 84   Ht 1.6 m (5' 3\")   Wt 110.2 kg (243 lb)   SpO2 99%   BMI 43.05 kg/m²     - GENERAL: NCAT, Appears well nourished   - EYES: EOMI, non-icteric, no proptosis   - Ear/Nose/Throat: NCAT, no visible inflammation or masses   - CARDIOVASCULAR: no cyanosis, no visible JVD   - RESPIRATORY: respiratory effort normal without any distress or labored breathing   - MUSCULOSKELETAL: Normal ROM of neck and upper extremities observed   - SKIN: No rash on face  - NEUROLOGIC:  No facial asymmetry (Cranial nerve 7 motor function), No gaze palsy   - PSYCHIATRIC: Normal affect, Normal insight and judgement     Data Reviewed: FINDINGS:       Study is degraded by motion artifact. There is a large enhancing mass in the  right aspect of the pituitary gland measuring 1.6 x 1.4 x 1.6 cm. Mild upward  convex margin toward the suprasellar cistern, though

## 2025-07-16 NOTE — TELEPHONE ENCOUNTER
----- Message from Diaz RICHEY sent at 7/16/2025 11:19 AM EDT -----  Regarding: [facility] Referral Request  [facility] Referral Request    Reason for referral request: Specialty Provider    Specialist/Lab/Test patient is requesting (if known):    Specialist Phone Number (if applicable):    Additional Information : patient need a referral to set an appointment with a ophthalmologist because she needs to do vision testing.  --------------------------------------------------------------------------------------------------------------------------    Relationship to Patient: Self     Call Back Information: OK to leave message on voicemail  Preferred Call Back Number: Phone  [phone]

## 2025-07-18 LAB
ACTH PLAS-MCNC: 14.9 PG/ML (ref 7.2–63.3)
CORTIS AM PEAK SERPL-MCNC: 10 UG/DL (ref 6.2–19.4)

## 2025-07-19 LAB
ALBUMIN SERPL-MCNC: 4.4 G/DL (ref 3.9–4.9)
ALP SERPL-CCNC: 105 IU/L (ref 44–121)
ALT SERPL-CCNC: 16 IU/L (ref 0–32)
AST SERPL-CCNC: 16 IU/L (ref 0–40)
BILIRUB SERPL-MCNC: 0.3 MG/DL (ref 0–1.2)
BUN SERPL-MCNC: 13 MG/DL (ref 8–27)
BUN/CREAT SERPL: 10 (ref 12–28)
CALCIUM SERPL-MCNC: 9.9 MG/DL (ref 8.7–10.3)
CHLORIDE SERPL-SCNC: 101 MMOL/L (ref 96–106)
CO2 SERPL-SCNC: 26 MMOL/L (ref 20–29)
CREAT SERPL-MCNC: 1.3 MG/DL (ref 0.57–1)
EGFRCR SERPLBLD CKD-EPI 2021: 44 ML/MIN/1.73
ESTRADIOL SERPL-MCNC: 22.9 PG/ML (ref 0–54.7)
FSH SERPL-ACNC: 49.4 MIU/ML (ref 25.8–134.8)
GLOBULIN SER CALC-MCNC: 3.4 G/DL (ref 1.5–4.5)
GLUCOSE SERPL-MCNC: 100 MG/DL (ref 70–99)
IGF-I SERPL-MCNC: 172 NG/ML (ref 52–196)
LH SERPL-ACNC: 21 MIU/ML (ref 7.7–58.5)
POTASSIUM SERPL-SCNC: 3.2 MMOL/L (ref 3.5–5.2)
PROLACTIN SERPL-MCNC: 14.4 NG/ML (ref 3.6–25.2)
PROT SERPL-MCNC: 7.8 G/DL (ref 6–8.5)
SODIUM SERPL-SCNC: 141 MMOL/L (ref 134–144)
T4 FREE SERPL-MCNC: 1.06 NG/DL (ref 0.82–1.77)
TSH SERPL DL<=0.005 MIU/L-ACNC: 3.18 UIU/ML (ref 0.45–4.5)

## 2025-07-27 ENCOUNTER — RESULTS FOLLOW-UP (OUTPATIENT)
Age: 70
End: 2025-07-27

## 2025-07-28 DIAGNOSIS — E87.6 HYPOKALEMIA: Primary | ICD-10-CM

## 2025-07-28 RX ORDER — POTASSIUM CHLORIDE 1500 MG/1
20 TABLET, EXTENDED RELEASE ORAL DAILY
Qty: 90 TABLET | Refills: 1 | Status: SHIPPED | OUTPATIENT
Start: 2025-07-28

## 2025-08-04 LAB
CORTIS F 24H UR-MCNC: 4 UG/L
CORTIS F 24H UR-MRATE: 5 UG/24 HR (ref 6–42)

## 2025-08-14 ENCOUNTER — OFFICE VISIT (OUTPATIENT)
Age: 70
End: 2025-08-14
Payer: MEDICARE

## 2025-08-14 VITALS
BODY MASS INDEX: 43.91 KG/M2 | HEIGHT: 63 IN | SYSTOLIC BLOOD PRESSURE: 130 MMHG | OXYGEN SATURATION: 97 % | RESPIRATION RATE: 18 BRPM | HEART RATE: 86 BPM | TEMPERATURE: 97.8 F | DIASTOLIC BLOOD PRESSURE: 80 MMHG | WEIGHT: 247.8 LBS

## 2025-08-14 DIAGNOSIS — G43.709 CHRONIC MIGRAINE WITHOUT AURA WITHOUT STATUS MIGRAINOSUS, NOT INTRACTABLE: ICD-10-CM

## 2025-08-14 DIAGNOSIS — R60.0 BILATERAL LOWER EXTREMITY EDEMA: ICD-10-CM

## 2025-08-14 DIAGNOSIS — E87.6 HYPOKALEMIA: ICD-10-CM

## 2025-08-14 DIAGNOSIS — Z86.73 HISTORY OF STROKE: ICD-10-CM

## 2025-08-14 DIAGNOSIS — D35.2 PITUITARY MACROADENOMA (HCC): ICD-10-CM

## 2025-08-14 DIAGNOSIS — E78.00 HYPERCHOLESTEROLEMIA: ICD-10-CM

## 2025-08-14 DIAGNOSIS — H53.8 BLURRED VISION: ICD-10-CM

## 2025-08-14 DIAGNOSIS — I10 BENIGN ESSENTIAL HTN: Primary | ICD-10-CM

## 2025-08-14 DIAGNOSIS — N18.32 STAGE 3B CHRONIC KIDNEY DISEASE (HCC): ICD-10-CM

## 2025-08-14 DIAGNOSIS — R73.01 ELEVATED FASTING BLOOD SUGAR: ICD-10-CM

## 2025-08-14 PROCEDURE — G8427 DOCREV CUR MEDS BY ELIG CLIN: HCPCS | Performed by: NURSE PRACTITIONER

## 2025-08-14 PROCEDURE — 99214 OFFICE O/P EST MOD 30 MIN: CPT | Performed by: NURSE PRACTITIONER

## 2025-08-14 PROCEDURE — 3078F DIAST BP <80 MM HG: CPT | Performed by: NURSE PRACTITIONER

## 2025-08-14 PROCEDURE — 1090F PRES/ABSN URINE INCON ASSESS: CPT | Performed by: NURSE PRACTITIONER

## 2025-08-14 PROCEDURE — 3017F COLORECTAL CA SCREEN DOC REV: CPT | Performed by: NURSE PRACTITIONER

## 2025-08-14 PROCEDURE — 1159F MED LIST DOCD IN RCRD: CPT | Performed by: NURSE PRACTITIONER

## 2025-08-14 PROCEDURE — G8417 CALC BMI ABV UP PARAM F/U: HCPCS | Performed by: NURSE PRACTITIONER

## 2025-08-14 PROCEDURE — 1126F AMNT PAIN NOTED NONE PRSNT: CPT | Performed by: NURSE PRACTITIONER

## 2025-08-14 PROCEDURE — G8400 PT W/DXA NO RESULTS DOC: HCPCS | Performed by: NURSE PRACTITIONER

## 2025-08-14 PROCEDURE — 3074F SYST BP LT 130 MM HG: CPT | Performed by: NURSE PRACTITIONER

## 2025-08-14 PROCEDURE — 1123F ACP DISCUSS/DSCN MKR DOCD: CPT | Performed by: NURSE PRACTITIONER

## 2025-08-14 PROCEDURE — 1036F TOBACCO NON-USER: CPT | Performed by: NURSE PRACTITIONER

## 2025-08-14 PROCEDURE — 1160F RVW MEDS BY RX/DR IN RCRD: CPT | Performed by: NURSE PRACTITIONER

## 2025-08-14 ASSESSMENT — PATIENT HEALTH QUESTIONNAIRE - PHQ9
SUM OF ALL RESPONSES TO PHQ QUESTIONS 1-9: 0
SUM OF ALL RESPONSES TO PHQ QUESTIONS 1-9: 0
1. LITTLE INTEREST OR PLEASURE IN DOING THINGS: NOT AT ALL
SUM OF ALL RESPONSES TO PHQ QUESTIONS 1-9: 0
2. FEELING DOWN, DEPRESSED OR HOPELESS: NOT AT ALL
SUM OF ALL RESPONSES TO PHQ QUESTIONS 1-9: 0

## 2025-08-14 ASSESSMENT — ENCOUNTER SYMPTOMS
SHORTNESS OF BREATH: 0
EYE PAIN: 0
CHEST TIGHTNESS: 0

## 2025-08-15 ENCOUNTER — RESULTS FOLLOW-UP (OUTPATIENT)
Age: 70
End: 2025-08-15

## 2025-08-15 LAB
ANION GAP SERPL CALC-SCNC: 13 MMOL/L (ref 2–14)
BUN SERPL-MCNC: 17 MG/DL (ref 8–23)
BUN/CREAT SERPL: 16 (ref 12–20)
CALCIUM SERPL-MCNC: 9.9 MG/DL (ref 8.8–10.2)
CHLORIDE SERPL-SCNC: 106 MMOL/L (ref 98–107)
CO2 SERPL-SCNC: 24 MMOL/L (ref 20–29)
CREAT SERPL-MCNC: 1.06 MG/DL (ref 0.6–1)
EST. AVERAGE GLUCOSE BLD GHB EST-MCNC: 127 MG/DL
GLUCOSE SERPL-MCNC: 94 MG/DL (ref 65–100)
HBA1C MFR BLD: 6 % (ref 4–5.6)
POTASSIUM SERPL-SCNC: 4.4 MMOL/L (ref 3.5–5.1)
SODIUM SERPL-SCNC: 142 MMOL/L (ref 136–145)

## 2025-08-18 ENCOUNTER — OFFICE VISIT (OUTPATIENT)
Age: 70
End: 2025-08-18
Payer: MEDICARE

## 2025-08-18 VITALS
DIASTOLIC BLOOD PRESSURE: 77 MMHG | OXYGEN SATURATION: 97 % | HEIGHT: 63 IN | WEIGHT: 254.6 LBS | SYSTOLIC BLOOD PRESSURE: 116 MMHG | HEART RATE: 92 BPM | BODY MASS INDEX: 45.11 KG/M2 | RESPIRATION RATE: 20 BRPM

## 2025-08-18 DIAGNOSIS — D35.2 PITUITARY MACROADENOMA (HCC): Primary | ICD-10-CM

## 2025-08-18 PROCEDURE — 1123F ACP DISCUSS/DSCN MKR DOCD: CPT | Performed by: INTERNAL MEDICINE

## 2025-08-18 PROCEDURE — 99214 OFFICE O/P EST MOD 30 MIN: CPT | Performed by: INTERNAL MEDICINE

## 2025-08-18 PROCEDURE — 3074F SYST BP LT 130 MM HG: CPT | Performed by: INTERNAL MEDICINE

## 2025-08-18 PROCEDURE — G2211 COMPLEX E/M VISIT ADD ON: HCPCS | Performed by: INTERNAL MEDICINE

## 2025-08-18 PROCEDURE — G8417 CALC BMI ABV UP PARAM F/U: HCPCS | Performed by: INTERNAL MEDICINE

## 2025-08-18 PROCEDURE — 3017F COLORECTAL CA SCREEN DOC REV: CPT | Performed by: INTERNAL MEDICINE

## 2025-08-18 PROCEDURE — 3078F DIAST BP <80 MM HG: CPT | Performed by: INTERNAL MEDICINE

## 2025-08-18 PROCEDURE — 1159F MED LIST DOCD IN RCRD: CPT | Performed by: INTERNAL MEDICINE

## 2025-08-18 PROCEDURE — 1036F TOBACCO NON-USER: CPT | Performed by: INTERNAL MEDICINE

## 2025-08-18 PROCEDURE — G8400 PT W/DXA NO RESULTS DOC: HCPCS | Performed by: INTERNAL MEDICINE

## 2025-08-18 PROCEDURE — 1125F AMNT PAIN NOTED PAIN PRSNT: CPT | Performed by: INTERNAL MEDICINE

## 2025-08-18 PROCEDURE — G8427 DOCREV CUR MEDS BY ELIG CLIN: HCPCS | Performed by: INTERNAL MEDICINE

## 2025-08-18 PROCEDURE — 1090F PRES/ABSN URINE INCON ASSESS: CPT | Performed by: INTERNAL MEDICINE

## 2025-08-18 ASSESSMENT — PATIENT HEALTH QUESTIONNAIRE - PHQ9
SUM OF ALL RESPONSES TO PHQ QUESTIONS 1-9: 0
2. FEELING DOWN, DEPRESSED OR HOPELESS: NOT AT ALL
SUM OF ALL RESPONSES TO PHQ QUESTIONS 1-9: 0
1. LITTLE INTEREST OR PLEASURE IN DOING THINGS: NOT AT ALL

## 2025-08-22 RX ORDER — CYCLOBENZAPRINE HCL 10 MG
TABLET ORAL
Qty: 90 TABLET | Refills: 0 | Status: SHIPPED | OUTPATIENT
Start: 2025-08-22